# Patient Record
Sex: FEMALE | Race: WHITE | Employment: FULL TIME | URBAN - METROPOLITAN AREA
[De-identification: names, ages, dates, MRNs, and addresses within clinical notes are randomized per-mention and may not be internally consistent; named-entity substitution may affect disease eponyms.]

---

## 2018-05-31 ENCOUNTER — OFFICE VISIT (OUTPATIENT)
Dept: ORTHOPEDIC SURGERY | Age: 63
End: 2018-05-31

## 2018-05-31 VITALS — HEIGHT: 62 IN | BODY MASS INDEX: 35.33 KG/M2 | RESPIRATION RATE: 16 BRPM | WEIGHT: 192 LBS

## 2018-05-31 DIAGNOSIS — Z96.652 S/P TOTAL KNEE ARTHROPLASTY, LEFT: Primary | ICD-10-CM

## 2018-05-31 DIAGNOSIS — G89.29 CHRONIC PAIN OF LEFT KNEE: ICD-10-CM

## 2018-05-31 DIAGNOSIS — R52 PAIN: ICD-10-CM

## 2018-05-31 DIAGNOSIS — M17.11 PRIMARY OSTEOARTHRITIS OF RIGHT KNEE: ICD-10-CM

## 2018-05-31 DIAGNOSIS — M25.562 CHRONIC PAIN OF LEFT KNEE: ICD-10-CM

## 2018-05-31 PROCEDURE — 99244 OFF/OP CNSLTJ NEW/EST MOD 40: CPT | Performed by: ORTHOPAEDIC SURGERY

## 2018-05-31 ASSESSMENT — ENCOUNTER SYMPTOMS
GASTROINTESTINAL NEGATIVE: 1
RESPIRATORY NEGATIVE: 1
BACK PAIN: 1
EYES NEGATIVE: 1

## 2019-10-07 ENCOUNTER — HOSPITAL ENCOUNTER (OUTPATIENT)
Dept: ULTRASOUND IMAGING | Age: 64
Discharge: HOME | End: 2019-10-07
Payer: MEDICARE

## 2019-10-07 DIAGNOSIS — E04.1 THYROID NODULE: ICD-10-CM

## 2019-10-07 PROCEDURE — 76536 US EXAM OF HEAD AND NECK: CPT

## 2020-05-20 ENCOUNTER — HOSPITAL ENCOUNTER (OUTPATIENT)
Age: 65
Discharge: HOME | End: 2020-05-20
Payer: MEDICARE

## 2020-05-20 LAB
ALBUMIN SERPL-MCNC: 4.4 GM/DL (ref 3.4–5)
ALP BLD-CCNC: 95 IU/L (ref 40–128)
ALT SERPL-CCNC: 12 U/L (ref 10–40)
ANION GAP SERPL CALCULATED.3IONS-SCNC: 13 MMOL/L (ref 4–16)
AST SERPL-CCNC: 20 IU/L (ref 15–37)
BILIRUB SERPL-MCNC: 0.4 MG/DL (ref 0–1)
BUN BLDV-MCNC: 16 MG/DL (ref 6–23)
CALCIUM SERPL-MCNC: 9.8 MG/DL (ref 8.3–10.6)
CHLORIDE BLD-SCNC: 101 MMOL/L (ref 99–110)
CHOLESTEROL: 162 MG/DL
CO2: 29 MMOL/L (ref 21–32)
CREAT SERPL-MCNC: 1.1 MG/DL (ref 0.6–1.1)
GFR AFRICAN AMERICAN: 60 ML/MIN/1.73M2
GFR NON-AFRICAN AMERICAN: 50 ML/MIN/1.73M2
GLUCOSE BLD-MCNC: 112 MG/DL (ref 70–99)
HDLC SERPL-MCNC: 51 MG/DL
LDL CHOLESTEROL DIRECT: 73 MG/DL
POTASSIUM SERPL-SCNC: 4.1 MMOL/L (ref 3.5–5.1)
SODIUM BLD-SCNC: 143 MMOL/L (ref 135–145)
TOTAL PROTEIN: 6.7 GM/DL (ref 6.4–8.2)
TRIGL SERPL-MCNC: 277 MG/DL

## 2020-05-20 PROCEDURE — 83721 ASSAY OF BLOOD LIPOPROTEIN: CPT

## 2020-05-20 PROCEDURE — 36415 COLL VENOUS BLD VENIPUNCTURE: CPT

## 2020-05-20 PROCEDURE — 80061 LIPID PANEL: CPT

## 2020-05-20 PROCEDURE — 80053 COMPREHEN METABOLIC PANEL: CPT

## 2020-07-10 ENCOUNTER — HOSPITAL ENCOUNTER (OUTPATIENT)
Dept: LAB | Age: 65
Discharge: HOME | End: 2020-07-10
Payer: MEDICARE

## 2020-07-10 LAB
ANION GAP SERPL CALCULATED.3IONS-SCNC: 12 MMOL/L (ref 4–16)
APTT: 40.7 SECONDS (ref 25.1–37.1)
BASOPHILS ABSOLUTE: 0.1 K/CU MM
BASOPHILS RELATIVE PERCENT: 1.1 % (ref 0–1)
BUN BLDV-MCNC: 20 MG/DL (ref 6–23)
CALCIUM SERPL-MCNC: 10.1 MG/DL (ref 8.3–10.6)
CHLORIDE BLD-SCNC: 101 MMOL/L (ref 99–110)
CO2: 28 MMOL/L (ref 21–32)
CREAT SERPL-MCNC: 1.1 MG/DL (ref 0.6–1.1)
DIFFERENTIAL TYPE: ABNORMAL
EOSINOPHILS ABSOLUTE: 0.1 K/CU MM
EOSINOPHILS RELATIVE PERCENT: 1.7 % (ref 0–3)
GFR AFRICAN AMERICAN: 60 ML/MIN/1.73M2
GFR NON-AFRICAN AMERICAN: 50 ML/MIN/1.73M2
GLUCOSE BLD-MCNC: 99 MG/DL (ref 70–99)
HCT VFR BLD CALC: 42.6 % (ref 37–47)
HEMOGLOBIN: 13.5 GM/DL (ref 12.5–16)
IMMATURE NEUTROPHIL %: 0.5 % (ref 0–0.43)
INR BLD: 1.77 INDEX
LYMPHOCYTES ABSOLUTE: 1.4 K/CU MM
LYMPHOCYTES RELATIVE PERCENT: 21.6 % (ref 24–44)
MCH RBC QN AUTO: 31.2 PG (ref 27–31)
MCHC RBC AUTO-ENTMCNC: 31.7 % (ref 32–36)
MCV RBC AUTO: 98.4 FL (ref 78–100)
MONOCYTES ABSOLUTE: 0.4 K/CU MM
MONOCYTES RELATIVE PERCENT: 5.8 % (ref 0–4)
NUCLEATED RBC %: 0 %
PDW BLD-RTO: 13.5 % (ref 11.7–14.9)
PLATELET # BLD: 185 K/CU MM (ref 140–440)
PMV BLD AUTO: 12.1 FL (ref 7.5–11.1)
POTASSIUM SERPL-SCNC: 4.6 MMOL/L (ref 3.5–5.1)
PROTHROMBIN TIME: 21.6 SECONDS (ref 11.7–14.5)
RBC # BLD: 4.33 M/CU MM (ref 4.2–5.4)
SEGMENTED NEUTROPHILS ABSOLUTE COUNT: 4.4 K/CU MM
SEGMENTED NEUTROPHILS RELATIVE PERCENT: 69.3 % (ref 36–66)
SODIUM BLD-SCNC: 141 MMOL/L (ref 135–145)
TOTAL IMMATURE NEUTOROPHIL: 0.03 K/CU MM
TOTAL NUCLEATED RBC: 0 K/CU MM
WBC # BLD: 6.3 K/CU MM (ref 4–10.5)

## 2020-07-10 PROCEDURE — 85730 THROMBOPLASTIN TIME PARTIAL: CPT

## 2020-07-10 PROCEDURE — 36415 COLL VENOUS BLD VENIPUNCTURE: CPT

## 2020-07-10 PROCEDURE — 85610 PROTHROMBIN TIME: CPT

## 2020-07-10 PROCEDURE — U0002 COVID-19 LAB TEST NON-CDC: HCPCS

## 2020-07-10 PROCEDURE — 80048 BASIC METABOLIC PNL TOTAL CA: CPT

## 2020-07-10 PROCEDURE — 85025 COMPLETE CBC W/AUTO DIFF WBC: CPT

## 2020-07-15 ENCOUNTER — HOSPITAL ENCOUNTER (OUTPATIENT)
Dept: CARDIAC CATH/INVASIVE PROCEDURES | Age: 65
Discharge: HOME | End: 2020-07-15
Attending: INTERNAL MEDICINE | Admitting: INTERNAL MEDICINE
Payer: MEDICARE

## 2020-07-15 VITALS
HEIGHT: 62 IN | TEMPERATURE: 96.4 F | RESPIRATION RATE: 16 BRPM | DIASTOLIC BLOOD PRESSURE: 76 MMHG | HEART RATE: 66 BPM | BODY MASS INDEX: 35.69 KG/M2 | OXYGEN SATURATION: 100 % | SYSTOLIC BLOOD PRESSURE: 125 MMHG

## 2020-07-15 LAB
SARS-COV-2: NOT DETECTED
SOURCE: NORMAL

## 2020-07-15 PROCEDURE — 2709999900 HC NON-CHARGEABLE SUPPLY

## 2020-07-15 PROCEDURE — 2580000003 HC RX 258: Performed by: INTERNAL MEDICINE

## 2020-07-15 PROCEDURE — 6360000002 HC RX W HCPCS

## 2020-07-15 PROCEDURE — 93458 L HRT ARTERY/VENTRICLE ANGIO: CPT

## 2020-07-15 PROCEDURE — 6370000000 HC RX 637 (ALT 250 FOR IP): Performed by: INTERNAL MEDICINE

## 2020-07-15 PROCEDURE — C1769 GUIDE WIRE: HCPCS

## 2020-07-15 PROCEDURE — C1894 INTRO/SHEATH, NON-LASER: HCPCS

## 2020-07-15 PROCEDURE — 6360000004 HC RX CONTRAST MEDICATION

## 2020-07-15 PROCEDURE — C1887 CATHETER, GUIDING: HCPCS

## 2020-07-15 RX ORDER — DIAZEPAM 5 MG/1
5 TABLET ORAL ONCE
Status: COMPLETED | OUTPATIENT
Start: 2020-07-15 | End: 2020-07-15

## 2020-07-15 RX ORDER — PANTOPRAZOLE SODIUM 40 MG/1
40 TABLET, DELAYED RELEASE ORAL DAILY
COMMUNITY

## 2020-07-15 RX ORDER — SODIUM CHLORIDE 0.9 % (FLUSH) 0.9 %
10 SYRINGE (ML) INJECTION PRN
Status: CANCELLED | OUTPATIENT
Start: 2020-07-15

## 2020-07-15 RX ORDER — SODIUM CHLORIDE 9 MG/ML
INJECTION, SOLUTION INTRAVENOUS CONTINUOUS
Status: DISCONTINUED | OUTPATIENT
Start: 2020-07-15 | End: 2020-07-15 | Stop reason: HOSPADM

## 2020-07-15 RX ORDER — RANOLAZINE 500 MG/1
500 TABLET, EXTENDED RELEASE ORAL 2 TIMES DAILY
COMMUNITY
End: 2022-03-03

## 2020-07-15 RX ORDER — ATROPINE SULFATE 0.4 MG/ML
0.5 AMPUL (ML) INJECTION
Status: CANCELLED | OUTPATIENT
Start: 2020-07-15 | End: 2020-07-15

## 2020-07-15 RX ORDER — LORATADINE 10 MG/1
10 TABLET ORAL DAILY
COMMUNITY
End: 2022-03-03

## 2020-07-15 RX ORDER — MEMANTINE HYDROCHLORIDE 10 MG/1
10 TABLET ORAL 2 TIMES DAILY
COMMUNITY
End: 2022-03-03 | Stop reason: SDUPTHER

## 2020-07-15 RX ORDER — ATORVASTATIN CALCIUM 40 MG/1
40 TABLET, FILM COATED ORAL NIGHTLY
COMMUNITY

## 2020-07-15 RX ORDER — DIAZEPAM 5 MG/1
5 TABLET ORAL EVERY 6 HOURS PRN
Status: DISCONTINUED | OUTPATIENT
Start: 2020-07-15 | End: 2020-07-15 | Stop reason: HOSPADM

## 2020-07-15 RX ORDER — ACETAMINOPHEN 325 MG/1
650 TABLET ORAL EVERY 4 HOURS PRN
Status: CANCELLED | OUTPATIENT
Start: 2020-07-15

## 2020-07-15 RX ORDER — SODIUM CHLORIDE 0.9 % (FLUSH) 0.9 %
10 SYRINGE (ML) INJECTION EVERY 12 HOURS SCHEDULED
Status: CANCELLED | OUTPATIENT
Start: 2020-07-15

## 2020-07-15 RX ORDER — SODIUM CHLORIDE 9 MG/ML
INJECTION, SOLUTION INTRAVENOUS CONTINUOUS
Status: CANCELLED | OUTPATIENT
Start: 2020-07-15

## 2020-07-15 RX ORDER — LISINOPRIL AND HYDROCHLOROTHIAZIDE 25; 20 MG/1; MG/1
1 TABLET ORAL DAILY
COMMUNITY
End: 2022-03-03

## 2020-07-15 RX ORDER — DIPHENHYDRAMINE HCL 25 MG
25 TABLET ORAL ONCE
Status: COMPLETED | OUTPATIENT
Start: 2020-07-15 | End: 2020-07-15

## 2020-07-15 RX ORDER — MORPHINE SULFATE 2 MG/ML
1 INJECTION, SOLUTION INTRAMUSCULAR; INTRAVENOUS
Status: CANCELLED | OUTPATIENT
Start: 2020-07-15 | End: 2020-07-15

## 2020-07-15 RX ADMIN — DIPHENHYDRAMINE HYDROCHLORIDE 25 MG: 25 TABLET ORAL at 08:12

## 2020-07-15 RX ADMIN — DIAZEPAM 5 MG: 5 TABLET ORAL at 08:12

## 2020-07-15 RX ADMIN — SODIUM CHLORIDE: 9 INJECTION, SOLUTION INTRAVENOUS at 08:12

## 2020-07-15 ASSESSMENT — PAIN DESCRIPTION - PAIN TYPE: TYPE: CHRONIC PAIN

## 2020-07-15 ASSESSMENT — PAIN SCALES - GENERAL: PAINLEVEL_OUTOF10: 8

## 2020-07-15 ASSESSMENT — PAIN DESCRIPTION - LOCATION: LOCATION: GENERALIZED

## 2020-07-15 NOTE — OP NOTE
Operative Note      Patient: Marli De La Cruz  YOB: 1955  MRN: [id number]    Date of Procedure: 7/15/20    Pre-Op Diagnosis: chest pain     Post-Op Diagnosis: Same    Estimated Blood Loss (mL): Minimal    Complications: None    Electronically signed by Juventino Tanner MD on 7/15/2020 at 9:30 AM     DICTATED --[id number]  LEFT MAIN PATENT  LAD/LCX AND RCA MILD DX  LVEDP 15-20  [address] [facility]

## 2020-07-15 NOTE — PROCEDURES
[address] [place] [address], [address] [place] [address]                            CARDIAC CATHETERIZATION    PATIENT NAME: Dina Oliver                 :        1955  MED REC NO:   [id number]                          ROOM:  ACCOUNT NO:   [de-identified]                           ADMIT DATE: 07/15/2020  PROVIDER:     Ermelinda Woods MD    DATE OF PROCEDURE:  07/15/2020    INDICATION:  Chest pain and shortness of breath. DESCRIPTION OF PROCEDURE:  This is a 78-year-old female patient, brought  to cath lab today. Informed consent was obtained from the patient. The  patient was prepped and draped in a sterile fashion. The patient was  injected with 5 mL of 2% lidocaine in the right radial region. Using a  radial needle, right radial artery was canalized and a 5/6-Kiswahili sheath  was placed in the right radial artery. The entire procedure was done  using guidewire. The sheath was flushed in between the procedures. Using a JL3.5 catheter, left coronary angiogram was performed. Left  coronary angiogram revealed the left main is patent. It bifurcates in  the LAD and circumflex artery. Circ is a medium-sized vessel, gives off the OM-1 and OM-2 branch. There is mild disease noted. LAD is a medium-sized vessel, reaches and wraps the apex, and gives off  a medium-sized diagonal branch. LAD has mild disease noted. Using an AR MOD catheter, right coronary angiogram performed. Right  coronary angiogram revealed the right coronary is a large-sized vessel,  dominant vessel. Right coronary artery has mild disease noted. Using a pigtail catheter, EDP was measured. EDP was slightly elevated,  between 15 to 20 mmHg present. The patient has a back stimulator present. IMPRESSION:  1. Left main is patent. 2.  LAD, circ, and ramus all have mild disease noted. 3.  EDP is around 15 to 20 mmHg present. Continue medical treatment.   The patient tolerated the procedure well. No complications noted.       BLOOD LOSS 10CC  Estephanie Sebastian MD    D: 07/15/2020 9:33:04       T: 07/15/2020 10:17:28     [email or web address]  Job#: [id number]     Doc#: [id number]    CC:

## 2020-07-15 NOTE — PROGRESS NOTES
Received from cath lab. Monitor and alarms on. Call Light in reach. Procedure site assessment completed per ZENON Haque RN and J Peabody RN      No hematoma or bleeding noted.

## 2020-07-15 NOTE — PROGRESS NOTES
Discharge instructions reviewed with patient. Voices understanding. Ambulated without difficulty. Tegaderm to Rt radial area. Armboard remains in place.

## 2020-07-15 NOTE — H&P
[address] [place] [address], [address] [place] [address]                              HISTORY AND PHYSICAL    PATIENT NAME: Va Pritchard                 :        1955  MED REC NO:   [id number]                          ROOM:  ACCOUNT NO:   [de-identified]                           ADMIT DATE: 07/15/2020  PROVIDER:     Nabila Mehta MD    REASON FOR ADMISSION:  Abnormal stress test and chest pain. HISTORY OF PRESENT ILLNESS:  This is a 57-year-old female patient, a  patient of Dr. Stephania Felix. She has been having chest pain and shortness  of breath present. The patient has a history of diabetes and  hypertension present. She has a history of stroke present. The patient continues to have symptoms present; therefore, the plan is  for heart catheterization. PAST MEDICAL HISTORY:  Coronary artery disease, hypertension,  hyperlipidemia, diabetes present, history of stroke, anxiety,  depression, fibromyalgia, GERD present, and hypothyroidism present. PAST SURGICAL HISTORY:  Appendectomy, gallbladder surgery, hysterectomy,  total knee replacement. SOCIAL HISTORY:  She does not smoke, does not drink. ALLERGIES:  NKDA. MEDICATIONS:  She is on aspirin, atorvastatin, Cartia, Coumadin,  lisinopril, hydrochlorothiazide, and Ranexa. PHYSICAL EXAMINATION:  GENERAL:  The patient is awake and alert, answering questions, not in  acute distress. VITAL SIGNS:  Temperature is afebrile, pulse is 66, blood pressure  150/73. HEENT:  Head is normocephalic and atraumatic. Pupils are equal and  reactive. CHEST:  Equal expansion. LUNGS:  Clear to auscultation. No wheezing or rhonchi. HEART:  Regular rhythm. ABDOMEN:  Soft and nontender. Bowel sounds are present. No  hepatosplenomegaly or guarding appreciated. EXTREMITIES:  No cyanosis or clubbing noted. NEUROLOGIC:  Cranial nerves II through XII are grossly intact.     DIAGNOSTIC STUDIES:  The patient had an echo done back in 05/2020. LV  function was preserved, mild aortic stenosis was noted. Stress test in  2019 with negative for ischemia. IMPRESSION AND PLAN:  1. This is a 49-year-old female patient with history of hypertension,  hyperlipidemia, and diabetes present, ongoing symptoms of chest pain and  shortness of breath present with minimal exertion. She had a stroke and  she is on Coumadin for that. The plan is for heart catheterization  today. We will make further recommendations. 2.  History of stroke present. She is on anticoagulation for that. I  would recommend may be possibly doing a loop recorder on her to rule out  paroxysmal atrial fibrillation. Further recommendations will be based  on hospital course.         Irlanda Dickens MD    D: 07/15/2020 8:01:36       T: 07/15/2020 9:52:01     [email or web address]  Job#: [id number]     Doc#: [id number]    CC:

## 2022-03-02 NOTE — PROGRESS NOTES
3/2/22    Fox Brooksdaisy  1955    Chief Complaint   Patient presents with    Follow-up     Multiple CVA's last one 2018. Used to follow with Dr. Delilah Manning        History of Present Illness  Jacqueline Kim is a 74 y.o. female presenting today for follow-up of:  MCI and history of CVA. She remains on memantine 10 mg twice daily and donepezil 10 mg daily. As of last visit 8/20/2021 she was preparing for surgery in September for discectomy and fusion. She had surgery and is feeling much better. She has gone back to work and is taking online [employer] courses for accounting. She is doing very well and having no difficulty with her memory or ability to follow tasks, keep up with assignments or retain information. She is in an accelerated program.     She remains on aspirin, Coumadin, statin for secondary stroke prevention. She does have history of JANENE and uses Pap device for management. She tells me today that she has been experiencing excessive throat clearing. She has been doing this ever since her last stroke. She also reports occasionally difficulty swallowing food and fluids and will sometimes cough and choke. She knows for sure her after her stroke in 1982 she had dysphagia and participated in speech/swallow therapy however she does not remember if she had any difficulty swallowing after her most recent stroke. She does report having allergies for which she is on allergy medication however she tells me her sisters have told her she was not clearing her throat like this prior to this past stroke.     Current Outpatient Medications   Medication Sig Dispense Refill    atorvastatin (LIPITOR) 40 MG tablet Take 40 mg by mouth nightly      memantine (NAMENDA) 10 MG tablet Take 10 mg by mouth 2 times daily      lisinopril-hydroCHLOROthiazide (PRINZIDE;ZESTORETIC) 20-25 MG per tablet Take 1 tablet by mouth daily      ranolazine (RANEXA) 500 MG extended release tablet Take 500 mg by mouth 2 times daily Takes 2 tabs twice daily      pantoprazole (PROTONIX) 40 MG tablet Take 40 mg by mouth daily      loratadine (CLARITIN) 10 MG tablet Take 10 mg by mouth daily      traMADol (ULTRAM) 50 MG tablet Take 1 tablet by mouth 2 times daily as needed for Pain 60 tablet 0    aspirin 81 MG chewable tablet Take 1 tablet by mouth daily 30 tablet 3    warfarin (COUMADIN) 5 MG tablet Take 1 tablet by mouth daily 30 tablet 3    gabapentin (NEURONTIN) 300 MG capsule Take 2 capsules by mouth 2 times daily (Patient taking differently: Take 600 mg by mouth nightly. ) 90 capsule 3    simvastatin (ZOCOR) 20 MG tablet Take 1 tablet by mouth nightly 30 tablet 3    docusate sodium (COLACE, DULCOLAX) 100 MG CAPS Take 100 mg by mouth 2 times daily as needed for Constipation      ondansetron (ZOFRAN) 4 MG tablet Take 1 tablet by mouth every 8 hours as needed for Nausea or Vomiting      Ascorbic Acid (VITAMIN C) 250 MG tablet Take 1,000 mg by mouth daily       diltiazem (DILACOR XR) 180 MG XR capsule Take 180 mg by mouth daily.  olmesartan-hydrochlorothiazide (BENICAR HCT) 40-25 MG per tablet Take 1 tablet by mouth daily.  Calcium Carbonate-Vitamin D (CALCIUM + D) 600-200 MG-UNIT TABS Take 1 tablet by mouth.  Cholecalciferol (VITAMIN D3) 50 MCG (2000 UT) TABS Take 1 capsule by mouth 50 mcg daily      vitamin B-12 (CYANOCOBALAMIN) 1000 MCG tablet Take 1,000 mcg by mouth daily.  therapeutic multivitamin-minerals (THERAGRAN-M) tablet Take 1 tablet by mouth daily Centrum silver multivitamin      Acetaminophen 650 MG TABS Take 1 tablet by mouth daily Arthritis tylenol 2 tabs in am       No current facility-administered medications for this visit.        Physical Exam:  Also present during visit:     Mental Status   Orientation: oriented to person, oriented to place, oriented to problem and oriented to time    Mood/affectappropriate mood and appropriate affect   Memory/Other: recent memory intact, remote memory intact, fund of knowledge intact, attention span normal and concentration normal   8/2/2021: MMSE 29/30 2/3 word recall, everything else correct. 5/3/2021 2/3 word recall)  Language  Language: (normal) language, no dysarthria, (normal) articulation and no dysphasia/aphasia  Cranial Nerves   Eyes: pupils normal size and reactive to light and visual fields appear full   CN III, IV, VI : extraocular muscle strength normal, normal pursuit, no nystagmus and no ptosis   Facial Motor: normal facial motor   CN XII: tongue protrudes midline  Motor/Coordination Exam   Power: motor strength appears intact throughout, no arm drift and normal tone,    Coordination: normal finger-to-nose, forearm rotation intact and rapid alternating movement normal Left upper and left lower extremity mild muscle weakness  Sensory Exam: normal light touch, normal vibration, normal position, no neglect, decreased pinprick LLE __, decreased pinprick LUE __, decreased temperature LUE      Gait and Stance   Gait/Posture: station normal, ambulates independently, abnormal Romberg's test moderate, unsteady casual gait mild (Mild limp of the left leg)           BP (!) 150/90 (Site: Left Upper Arm, Position: Sitting, Cuff Size: Medium Adult)   Pulse 63   Ht 5' 2\" (1.575 m)   Wt 190 lb (86.2 kg)   SpO2 98%   BMI 34.75 kg/m²     Assessment and Plan     Diagnosis Orders   1. Dysphagia as late effect of cerebrovascular accident (CVA)  SLP swallowing-dysphagia (IP)   2. History of CVA (cerebrovascular accident)     3. Fibromyalgia     4. Sleep apnea, unspecified type       I will order speech/swallow therapy to determine if Muna Herndon is experiencing some dysphagia after her past stroke. She will remain on aspirin, Coumadin, statin for secondary stroke prevention. In regard to memory, she is doing outstanding. I am very proud of her for going back to school and taking accelerated accounting classes. She is doing well at work.   She has no concerns with her memory. She will remain on memantine and Aricept. She will continue wearing her Pap device routinely for management of her JANENE. I will plan on following up with Willie Jenkins again in 3 months, sooner if the need arises. Patient understands the importance of recognizing strokelike symptoms such as acute mental status change, slurred speech, facial droop, one-sided weakness differing from baseline. Patient knows to call 911 immediately in the event that they experience any of these symptoms. Medications prescribed for the patient were discussed in detail. This included a discussion of the potential risks versus potential benefits of the medications. The patient was given time to ask questions and these were answered to the best of my ability. The patient appeared to understand the information provided. We discussed nonpharmacologic interventions including staying active cognitively, socially, and physically to help slow down the progression of memory loss. Return in about 3 months (around 6/3/2022).     JAIME Alves NP

## 2022-03-03 ENCOUNTER — OFFICE VISIT (OUTPATIENT)
Dept: NEUROLOGY | Age: 67
End: 2022-03-03
Payer: MEDICARE

## 2022-03-03 VITALS
WEIGHT: 190 LBS | HEART RATE: 63 BPM | BODY MASS INDEX: 34.96 KG/M2 | SYSTOLIC BLOOD PRESSURE: 150 MMHG | DIASTOLIC BLOOD PRESSURE: 90 MMHG | HEIGHT: 62 IN | OXYGEN SATURATION: 98 %

## 2022-03-03 DIAGNOSIS — M79.7 FIBROMYALGIA: ICD-10-CM

## 2022-03-03 DIAGNOSIS — G47.30 SLEEP APNEA, UNSPECIFIED TYPE: ICD-10-CM

## 2022-03-03 DIAGNOSIS — I69.391 DYSPHAGIA AS LATE EFFECT OF CEREBROVASCULAR ACCIDENT (CVA): Primary | ICD-10-CM

## 2022-03-03 DIAGNOSIS — Z86.73 HISTORY OF CVA (CEREBROVASCULAR ACCIDENT): ICD-10-CM

## 2022-03-03 PROCEDURE — 99214 OFFICE O/P EST MOD 30 MIN: CPT | Performed by: NURSE PRACTITIONER

## 2022-03-03 RX ORDER — DONEPEZIL HYDROCHLORIDE 10 MG/1
10 TABLET, FILM COATED ORAL NIGHTLY
COMMUNITY
End: 2022-03-03 | Stop reason: SDUPTHER

## 2022-03-03 RX ORDER — MEMANTINE HYDROCHLORIDE 10 MG/1
10 TABLET ORAL 2 TIMES DAILY
Qty: 180 TABLET | Refills: 3 | Status: SHIPPED | OUTPATIENT
Start: 2022-03-03

## 2022-03-03 RX ORDER — LOSARTAN POTASSIUM 25 MG/1
25 TABLET ORAL DAILY
COMMUNITY

## 2022-03-03 RX ORDER — DONEPEZIL HYDROCHLORIDE 10 MG/1
10 TABLET, FILM COATED ORAL NIGHTLY
Qty: 90 TABLET | Refills: 3 | Status: SHIPPED | OUTPATIENT
Start: 2022-03-03

## 2022-03-03 RX ORDER — FEXOFENADINE HCL 180 MG/1
180 TABLET ORAL DAILY
COMMUNITY

## 2022-03-25 ENCOUNTER — HOSPITAL ENCOUNTER (OUTPATIENT)
Dept: SPEECH THERAPY | Age: 67
Setting detail: THERAPIES SERIES
Discharge: HOME | End: 2022-03-25
Payer: MEDICARE

## 2022-03-25 PROCEDURE — 92610 EVALUATE SWALLOWING FUNCTION: CPT

## 2022-03-25 NOTE — PROGRESS NOTES
Speech Language Pathology  Facility/Department: [address] [place] [address] THERAPY   CLINICAL BEDSIDE SWALLOW EVALUATION    NAME: Marisol Call  : 1955  MRN: [id number]    ADMISSION DATE: 3/25/2022     IMPRESSIONS: Marisol Call was seen for an outpatient clinical swallow evaluation. Pt reports that she has seen neurology, GI, and ENT for complaint of throat clearing and occasional \"choking\" sensation with foods and liquids, ultimately leading to referral for dysphagia assessment. Symptoms were noted to begin following initial CVA in the . She reports that throat clearing happens intermittently, not necessarily with eating or drinking. Per pt, previous GI and ENT assessments have been unremarkable. Pt also reports occasional globus sensation when eating or drinking. She states that she was prescribed medication for reflux but was unable to take it d/t interaction with other medications. She denies any recent history of PNA. PMHx significant for GERD, hypertension, fibromyalgia, chronic bronchitis, CVA and TIA; most recent CVA in 2017. No prior SLP notes found in chart. Pt seen for evaluation seated upright in chair, alert, pleasant, cooperative. Oral mechanism examination WFL without asymmetry. Pt presented with PO trials of thin liquids via cup/straw, puree, and regular solids. Oral stage WFL with intact labial seal, mastication/bolus formation, AP transit, and oral clearance. Pharyngeal stage WFL with adequate swallow initiation/laryngeal elevation. Coughing noted x2 during assessment but did not appear related to PO trials. No overt s/s aspiration identified. Education and handouts were provided re: reflux precautions, habitual throat clearing and vocal hygiene. Behavioral strategies and dietary modifications were reviewed. Pt indicated understanding. Recommend continued regular diet/thin liquids with general reflux precautions.  Recommend vocal hygiene including swallow in place of throat clearing and improving hydration with increased water intake. No further dysphagia tx indicated at this time. Results/recommendations were discussed with pt, who verbalized understanding/agreement. Past Medical History:  has a past medical history of Diabetes mellitus ([address] Fibromyalgia, Heart murmur, Hypertension, Migraines, Sleep apnea, Thyroid disease, and Unspecified cerebral artery occlusion with cerebral infarction. Past Surgical History:  has a past surgical history that includes back surgery (rods and fusions in the back); Cholecystectomy, laparoscopic (05/22/14); Appendectomy; Colonoscopy; Hysterectomy; Tonsillectomy; joint replacement; and Spinal Cord Stimulator Surgery. Recent Chest Xray/CT of Chest: see chart  Treatment Dx (ICD 10 code): dysphagia R13.10        Date of Eval: 3/25/2022  Evaluating Therapist: ROSA Rios    Current Diet level:  Current Diet : Regular  Current Liquid Diet : Thin    Primary Complaint:   Patient Complaint: throat clearing        Pain:   0    Reason for Referral  Torrey Baca was referred for a bedside swallow evaluation to assess the efficiency of her swallow function, identify signs and symptoms of aspiration, and make recommendations regarding safe dietary consistencies, effective compensatory strategies, and safe eating environment. Impression  Dysphagia Diagnosis  Dysphagia Diagnosis: Swallow function appears grossly intact  Dysphagia Outcome Severity Scale: Level 6: Within functional limits/Modified independence    Treatment Plan  Requires SLP Intervention: No     Duration/Frequency of Treatment: n/a  D/C Recommendations: Home independently      Recommended Diet and Intervention  Solid: Diet Solids Recommendation: Regular  Liquid: Liquid Consistency Recommendation: Thin  Medication:Recommended Form of Meds: PO       Compensatory Swallowing Strategies Attempted  Compensatory Swallowing Strategies:  Alternate solids and liquids;Eat/Feed slowly;Upright as possible for all oral intake;Small bites/sips      Treatment/Goals  Short-term Goals  Timeframe for Short-term Goals: n/a  Long-term Goals  Timeframe for Long-term Goals: n/a    General  Chart Reviewed: Yes  Behavior/Cognition  Behavior/Cognition: Alert; Cooperative;Pleasant mood  Respiratory Status: Room air  O2 Device: None (Room air)  Communication Observation: Functional  Follows Directions: Simple  Dentition: Adequate  Patient Positioning: Upright in chair  Baseline Vocal Quality: Normal  Consistencies Administered: Reg solid; Dysphagia Pureed (Dysphagia I); Thin - cup; Thin - straw         Vision/Hearing  Vision  Vision: Within Functional Limits  Hearing  Hearing: Within functional limits    Oral Motor Deficits  Oral/Motor  Oral Motor:  Within functional limits    Oral Phase Dysfunction  Oral Phase  Oral Phase: WFL     Indicators of Pharyngeal Phase Dysfunction   Pharyngeal Phase  Pharyngeal Phase: WFL    Prognosis  Prognosis  Barriers/Prognosis Comment: n/a  Individuals consulted  Consulted and agree with results and recommendations: Patient    Education  Patient Education: results, recommendations  Patient Education Response: Verbalizes understanding       Therapy Time  SLP Individual Minutes  Time In: 1500  Time Out: 7101  Minutes: 703 [facility]-SLP  3/25/2022 3:49 PM

## 2023-04-17 ENCOUNTER — HOSPITAL ENCOUNTER (OUTPATIENT)
Dept: PULMONOLOGY | Age: 68
Discharge: HOME | End: 2023-04-17
Payer: MEDICARE

## 2023-04-17 DIAGNOSIS — R06.02 SOB (SHORTNESS OF BREATH): ICD-10-CM

## 2023-04-17 LAB
DLCO %PRED: 90 %
DLCO PRED: NORMAL
DLCO/VA %PRED: NORMAL
DLCO/VA PRED: NORMAL
DLCO/VA: NORMAL
DLCO: NORMAL
EXPIRATORY TIME-POST: NORMAL
EXPIRATORY TIME: NORMAL
FEF 25-75% %CHNG: NORMAL
FEF 25-75% %PRED-POST: NORMAL
FEF 25-75% %PRED-PRE: NORMAL
FEF 25-75% PRED: NORMAL
FEF 25-75%-POST: NORMAL
FEF 25-75%-PRE: NORMAL
FEV1 %PRED-POST: 93 %
FEV1 %PRED-PRE: 78 %
FEV1 PRED: NORMAL
FEV1-POST: NORMAL
FEV1-PRE: NORMAL
FEV1/FVC %PRED-POST: 111 %
FEV1/FVC %PRED-PRE: 106 %
FEV1/FVC PRED: NORMAL
FEV1/FVC-POST: NORMAL
FEV1/FVC-PRE: NORMAL
FVC %PRED-POST: 83 L
FVC %PRED-PRE: 73 %
FVC PRED: NORMAL
FVC-POST: NORMAL
FVC-PRE: NORMAL
GAW %PRED: NORMAL
GAW PRED: NORMAL
GAW: NORMAL
IC %PRED: NORMAL
IC PRED: NORMAL
IC: NORMAL
MEP: NORMAL
MIP: NORMAL
MVV %PRED-PRE: NORMAL
MVV PRED: NORMAL
MVV-PRE: NORMAL
PEF %PRED-POST: NORMAL
PEF %PRED-PRE: NORMAL
PEF PRED: NORMAL
PEF%CHNG: NORMAL
PEF-POST: NORMAL
PEF-PRE: NORMAL
RAW %PRED: NORMAL
RAW PRED: NORMAL
RAW: NORMAL
RV %PRED: NORMAL
RV PRED: NORMAL
RV: NORMAL
SVC %PRED: NORMAL
SVC PRED: NORMAL
SVC: NORMAL
TLC %PRED: 87 %
TLC PRED: NORMAL
TLC: NORMAL
VA %PRED: NORMAL
VA PRED: NORMAL
VA: NORMAL
VTG %PRED: NORMAL
VTG PRED: NORMAL
VTG: NORMAL

## 2023-04-17 PROCEDURE — 94060 EVALUATION OF WHEEZING: CPT

## 2023-04-17 PROCEDURE — 94726 PLETHYSMOGRAPHY LUNG VOLUMES: CPT

## 2023-04-17 PROCEDURE — 94729 DIFFUSING CAPACITY: CPT

## 2023-04-17 ASSESSMENT — PULMONARY FUNCTION TESTS
FEV1/FVC_PERCENT_PREDICTED_POST: 111
FEV1_PERCENT_PREDICTED_PRE: 78
FVC_PERCENT_PREDICTED_PRE: 73
FEV1/FVC_PERCENT_PREDICTED_PRE: 106
FVC_PERCENT_PREDICTED_POST: 83
FEV1_PERCENT_PREDICTED_POST: 93

## 2023-07-24 RX ORDER — ALBUTEROL SULFATE 90 UG/1
2 AEROSOL, METERED RESPIRATORY (INHALATION) EVERY 6 HOURS PRN
COMMUNITY

## 2023-07-24 RX ORDER — FLUTICASONE FUROATE AND VILANTEROL 100; 25 UG/1; UG/1
1 POWDER RESPIRATORY (INHALATION) DAILY
COMMUNITY

## 2023-07-25 ENCOUNTER — ANESTHESIA EVENT (OUTPATIENT)
Dept: ENDOSCOPY | Age: 68
End: 2023-07-25
Payer: MEDICARE

## 2023-07-25 NOTE — ANESTHESIA PRE PROCEDURE
Department of Anesthesiology  Preprocedure Note       Name:  Dov Dupont   Age:  77 y.o.  :  1955                                          MRN:  [id number]         Date:  2023      Surgeon: Lincoln Mcardle):  Jenna Arce MD    Procedure: Procedure(s):  COLONOSCOPY DIAGNOSTIC    Medications prior to admission:   Prior to Admission medications    Medication Sig Start Date End Date Taking? Authorizing Provider   albuterol sulfate HFA (PROVENTIL;VENTOLIN;PROAIR) 108 (90 Base) MCG/ACT inhaler Inhale 2 puffs into the lungs every 6 hours as needed for Wheezing   Yes Historical Provider, MD   fluticasone furoate-vilanterol (BREO ELLIPTA) 100-25 MCG/ACT inhaler Inhale 1 puff into the lungs daily   Yes Historical Provider, MD   apixaban (ELIQUIS) 5 MG TABS tablet Take by mouth 2 times daily   Yes Historical Provider, MD   losartan (COZAAR) 25 MG tablet Take 25 mg by mouth daily    Historical Provider, MD   fexofenadine (ALLEGRA ALLERGY) 180 MG tablet Take 180 mg by mouth daily    Historical Provider, MD   Acetaminophen (TYLENOL ARTHRITIS PAIN PO) Take by mouth    Historical Provider, MD   donepezil (ARICEPT) 10 MG tablet Take 1 tablet by mouth nightly 3/3/22   JAIME Toribio - NP   memantine (NAMENDA) 10 MG tablet Take 1 tablet by mouth 2 times daily 3/3/22   JAIME Toribio - NP   atorvastatin (LIPITOR) 40 MG tablet Take 40 mg by mouth nightly    Historical Provider, MD   pantoprazole (PROTONIX) 40 MG tablet Take 40 mg by mouth daily    Historical Provider, MD   aspirin 81 MG chewable tablet Take 1 tablet by mouth daily 10/3/16   [facility], APRN - CNP   warfarin (COUMADIN) 5 MG tablet Take 1 tablet by mouth daily 10/3/16   [facility], APRN - CNP   gabapentin (NEURONTIN) 300 MG capsule Take 2 capsules by mouth 2 times daily  Patient taking differently: Take 600 mg by mouth 2 times daily as needed.   10/3/16   JAIME Jaramillo - CNP   Ascorbic Acid (VITAMIN C) 250 MG tablet Take 1,000 mg by mouth

## 2023-07-26 NOTE — PROGRESS NOTES
Spoke with patient and she will arrive at 0800 at [facility] on 7/27/2023 for her procedure at 0930. IV order is in epic.

## 2023-07-27 ENCOUNTER — HOSPITAL ENCOUNTER (OUTPATIENT)
Age: 68
Setting detail: OUTPATIENT SURGERY
Discharge: HOME | End: 2023-07-27
Attending: INTERNAL MEDICINE | Admitting: INTERNAL MEDICINE
Payer: MEDICARE

## 2023-07-27 ENCOUNTER — ANESTHESIA (OUTPATIENT)
Dept: ENDOSCOPY | Age: 68
End: 2023-07-27
Payer: MEDICARE

## 2023-07-27 VITALS
OXYGEN SATURATION: 99 % | RESPIRATION RATE: 16 BRPM | SYSTOLIC BLOOD PRESSURE: 164 MMHG | DIASTOLIC BLOOD PRESSURE: 58 MMHG | BODY MASS INDEX: 40.48 KG/M2 | WEIGHT: 220 LBS | HEART RATE: 88 BPM | HEIGHT: 62 IN | TEMPERATURE: 98.1 F

## 2023-07-27 LAB — GLUCOSE BLD-MCNC: 122 MG/DL (ref 70–99)

## 2023-07-27 PROCEDURE — 3609027000 HC COLONOSCOPY: Performed by: INTERNAL MEDICINE

## 2023-07-27 PROCEDURE — 6360000002 HC RX W HCPCS: Performed by: NURSE ANESTHETIST, CERTIFIED REGISTERED

## 2023-07-27 PROCEDURE — 2500000003 HC RX 250 WO HCPCS: Performed by: NURSE ANESTHETIST, CERTIFIED REGISTERED

## 2023-07-27 PROCEDURE — 2580000003 HC RX 258: Performed by: ANESTHESIOLOGY

## 2023-07-27 PROCEDURE — 7100000010 HC PHASE II RECOVERY - FIRST 15 MIN: Performed by: INTERNAL MEDICINE

## 2023-07-27 PROCEDURE — 2709999900 HC NON-CHARGEABLE SUPPLY: Performed by: INTERNAL MEDICINE

## 2023-07-27 PROCEDURE — 3700000000 HC ANESTHESIA ATTENDED CARE: Performed by: INTERNAL MEDICINE

## 2023-07-27 PROCEDURE — 7100000011 HC PHASE II RECOVERY - ADDTL 15 MIN: Performed by: INTERNAL MEDICINE

## 2023-07-27 PROCEDURE — 82962 GLUCOSE BLOOD TEST: CPT

## 2023-07-27 PROCEDURE — 3700000001 HC ADD 15 MINUTES (ANESTHESIA): Performed by: INTERNAL MEDICINE

## 2023-07-27 RX ORDER — HYDRALAZINE HYDROCHLORIDE 20 MG/ML
INJECTION INTRAMUSCULAR; INTRAVENOUS PRN
Status: DISCONTINUED | OUTPATIENT
Start: 2023-07-27 | End: 2023-07-27 | Stop reason: SDUPTHER

## 2023-07-27 RX ORDER — SODIUM CHLORIDE, SODIUM LACTATE, POTASSIUM CHLORIDE, CALCIUM CHLORIDE 600; 310; 30; 20 MG/100ML; MG/100ML; MG/100ML; MG/100ML
INJECTION, SOLUTION INTRAVENOUS CONTINUOUS
Status: DISCONTINUED | OUTPATIENT
Start: 2023-07-27 | End: 2023-07-27 | Stop reason: HOSPADM

## 2023-07-27 RX ORDER — PROPOFOL 10 MG/ML
INJECTION, EMULSION INTRAVENOUS PRN
Status: DISCONTINUED | OUTPATIENT
Start: 2023-07-27 | End: 2023-07-27 | Stop reason: SDUPTHER

## 2023-07-27 RX ORDER — LIDOCAINE HYDROCHLORIDE 20 MG/ML
INJECTION, SOLUTION EPIDURAL; INFILTRATION; INTRACAUDAL; PERINEURAL PRN
Status: DISCONTINUED | OUTPATIENT
Start: 2023-07-27 | End: 2023-07-27 | Stop reason: SDUPTHER

## 2023-07-27 RX ADMIN — PROPOFOL 440 MG: 10 INJECTION, EMULSION INTRAVENOUS at 10:32

## 2023-07-27 RX ADMIN — SODIUM CHLORIDE, POTASSIUM CHLORIDE, SODIUM LACTATE AND CALCIUM CHLORIDE: 600; 310; 30; 20 INJECTION, SOLUTION INTRAVENOUS at 08:42

## 2023-07-27 RX ADMIN — LIDOCAINE HYDROCHLORIDE 100 MG: 20 INJECTION, SOLUTION EPIDURAL; INFILTRATION; INTRACAUDAL; PERINEURAL at 10:12

## 2023-07-27 RX ADMIN — HYDRALAZINE HYDROCHLORIDE 5 MG: 20 INJECTION, SOLUTION INTRAMUSCULAR; INTRAVENOUS at 10:07

## 2023-07-27 ASSESSMENT — PAIN SCALES - GENERAL
PAINLEVEL_OUTOF10: 7
PAINLEVEL_OUTOF10: 7

## 2023-07-27 ASSESSMENT — PAIN - FUNCTIONAL ASSESSMENT
PAIN_FUNCTIONAL_ASSESSMENT: ACTIVITIES ARE NOT PREVENTED
PAIN_FUNCTIONAL_ASSESSMENT: 0-10
PAIN_FUNCTIONAL_ASSESSMENT: ACTIVITIES ARE NOT PREVENTED

## 2023-07-27 ASSESSMENT — PAIN DESCRIPTION - ORIENTATION
ORIENTATION: LOWER
ORIENTATION: LOWER

## 2023-07-27 ASSESSMENT — PAIN DESCRIPTION - LOCATION
LOCATION: BACK
LOCATION: BACK

## 2023-07-27 ASSESSMENT — PAIN DESCRIPTION - PAIN TYPE
TYPE: CHRONIC PAIN
TYPE: CHRONIC PAIN

## 2023-07-27 ASSESSMENT — PAIN DESCRIPTION - DESCRIPTORS
DESCRIPTORS: ACHING
DESCRIPTORS: ACHING

## 2023-07-27 ASSESSMENT — PAIN DESCRIPTION - FREQUENCY: FREQUENCY: CONTINUOUS

## 2023-07-27 NOTE — ANESTHESIA POSTPROCEDURE EVALUATION
Department of Anesthesiology  Postprocedure Note    Patient: Garth Knutson  MRN: [id number]  YOB: 1955  Date of evaluation: 7/27/2023      Procedure Summary     Date: 07/27/23 Room / Location: [address] [place] [address]    Anesthesia Start: 1002 Anesthesia Stop: 1034    Procedure: COLONOSCOPY DIAGNOSTIC Diagnosis:       Change in bowel habits      RUQ pain      Nausea and vomiting, unspecified vomiting type      History of CVA (cerebrovascular accident) without residual deficits      (Change in bowel habits [R19.4])      (RUQ pain [R10.11])      (Nausea and vomiting, unspecified vomiting type [R11.2])      (History of CVA (cerebrovascular accident) without residual deficits [Z86.73])    Surgeons: Shi Watson MD Responsible Provider: JAIME Esposito CRNA    Anesthesia Type: MAC ASA Status: 3          Anesthesia Type: No value filed.     Kiki Phase I: Kiki Score: 10    Kiki Phase II:        Anesthesia Post Evaluation    Patient location during evaluation: bedside  Patient participation: complete - patient participated  Level of consciousness: awake and alert  Pain score: 0  Airway patency: patent  Nausea & Vomiting: no nausea and no vomiting  Complications: no  Cardiovascular status: blood pressure returned to baseline  Respiratory status: acceptable  Hydration status: euvolemic

## 2023-07-27 NOTE — DISCHARGE INSTRUCTIONS
COLONOSCOPY    _______________    OFFICE [facility]__[phone]______________________    FOLLOW UP APPOINTMENT AS NEEDED. REPEAT PROCEDURE IN _5_YEARS OR AS NEEDED. TEST ORDERED: NONE. What to expect at home: Your Recovery   Your doctor will tell you when you can eat and do your other usual activities Your doctor will talk to you about when you will need your next colonoscopy. Your doctor can help you decide how often you need to be checked. This will depend on the results of your test and your risk for colorectal cancer. After the test, you may be bloated or have gas pains. You may need to pass gas. If a biopsy was done or a polyp was removed, you may have streaks of blood in your stool (feces) for a few days. This care sheet gives you a general idea about how long it will take for you to recover. But each person recovers at a different pace. Follow the steps below to get better as quickly as possible. How can you care for yourself at home? Activity  Rest when you feel tired. Diet  Follow your doctor's directions for eating. Unless your doctor has told you not to, drink plenty of fluids. This helps to replace the fluids that were lost during the colon prep. DO NOT DRINK ALCOHOL. Medicines  Your doctor will tell you if and when you can restart your medicines. He or she will also give you instructions about taking any new medicines. If you take blood thinners, such as warfarin (Coumadin), clopidogrel (Plavix), or aspirin, be sure to talk to your doctor. He or she will tell you if and when to start taking those medicines again. Make sure that you understand exactly what your doctor wants you to do. If polyps were removed or a biopsy was done during the test, your doctor may tell you not to take aspirin or other anti-inflammatory medicines for a few days. These include ibuprofen (Advil, Motrin) and naproxen (Aleve).   Other instructions: Anethesia  For your safety, do not drive or operate

## 2023-07-27 NOTE — ANESTHESIA PRE PROCEDURE
Department of Anesthesiology  Preprocedure Note       Name:  Garth Knutson   Age:  77 y.o.  :  1955                                          MRN:  [id number]         Date:  2023      Surgeon: Troy Piña):  Shi Watson MD    Procedure: Procedure(s):  COLONOSCOPY DIAGNOSTIC    Medications prior to admission:   Prior to Admission medications    Medication Sig Start Date End Date Taking? Authorizing Provider   albuterol sulfate HFA (PROVENTIL;VENTOLIN;PROAIR) 108 (90 Base) MCG/ACT inhaler Inhale 2 puffs into the lungs every 6 hours as needed for Wheezing   Yes Historical Provider, MD   fluticasone furoate-vilanterol (BREO ELLIPTA) 100-25 MCG/ACT inhaler Inhale 1 puff into the lungs daily   Yes Historical Provider, MD   apixaban (ELIQUIS) 5 MG TABS tablet Take by mouth 2 times daily   Yes Historical Provider, MD   losartan (COZAAR) 25 MG tablet Take 25 mg by mouth daily    Historical Provider, MD   fexofenadine (ALLEGRA) 180 MG tablet Take 1 tablet by mouth daily    Historical Provider, MD   Acetaminophen (TYLENOL ARTHRITIS PAIN PO) Take by mouth    Historical Provider, MD   donepezil (ARICEPT) 10 MG tablet Take 1 tablet by mouth nightly 3/3/22   JAIME Vasquez - NP   memantine (NAMENDA) 10 MG tablet Take 1 tablet by mouth 2 times daily 3/3/22   JAIME Vasquez NP   atorvastatin (LIPITOR) 40 MG tablet Take 1 tablet by mouth nightly    Historical Provider, MD   pantoprazole (PROTONIX) 40 MG tablet Take 1 tablet by mouth daily    Historical Provider, MD   aspirin 81 MG chewable tablet Take 1 tablet by mouth daily 10/3/16   JAIME Harris CNP   warfarin (COUMADIN) 5 MG tablet Take 1 tablet by mouth daily 10/3/16   JAIME Harris CNP   gabapentin (NEURONTIN) 300 MG capsule Take 2 capsules by mouth 2 times daily  Patient taking differently: Take 2 capsules by mouth 2 times daily as needed.  10/3/16   JAIME Jaramillo CNP   Ascorbic Acid (VITAMIN C) 250 MG tablet Take 4 tablets by

## 2023-07-27 NOTE — PROGRESS NOTES
1040  Pt back to Same Day from Endo per cart. Pt is a little sleepy but arouses easily to name. VS rechecked and stable. Report received from [facility]. Pt given soda to drink per request.  Daughter to bedside. 1110 VS remain stable. Skin W&D. Pt tolerating drink w/o nausea. \  1125 IV dc'd and pt assisted up to bathroom. Voided w/o difficulty. 36 Pt and daughter instructed for discharge care and follow-up with understanding voiced. 1140  pt to car at exit per wheelchair by volunteer. Jimmie Closs

## 2023-07-27 NOTE — H&P
[facility]  Pre-operative History and Physical    Patient: Shaniqua Jackson  : 1955  Acct#:       ASSESSMENT AND PLAN:    1. Patient is a 77 y.o. female here for procedure: with deep sedation  - Colonoscopy: Alternating BM    2. Procedure options, risks and benefits reviewed with patient. Patient expresses understanding. Kevan Goddard MD  [facility]  2023  8:32 AM      HISTORY OF PRESENT ILLNESS:    The patient is a 77 y.o. female with significant past medical history as below who presents for procedure. Indication: Alternating BM    History Obtained From:  Patient and review of all records    Past Medical History:        Diagnosis Date    Arthritis     in    Cervical herniated disc     lumbar    COPD (chronic obstructive pulmonary disease) ([address])     Diabetes mellitus ([address])     controlled with diet    Fibromyalgia     Heart murmur     Hyperlipidemia     Hypertension     Migraines     PONV (postoperative nausea and vomiting)     Sleep apnea     Thyroid disease     Unspecified cerebral artery occlusion with cerebral infarction     cva x 3       Past Surgical History:        Procedure Laterality Date    APPENDECTOMY      BACK SURGERY  rods and fusions in the back    CHOLECYSTECTOMY, LAPAROSCOPIC  2014    COLONOSCOPY      EYE SURGERY Bilateral     cataracts    HYSTERECTOMY (CERVIX STATUS UNKNOWN)      JOINT REPLACEMENT      left knee    SPINAL CORD STIMULATOR SURGERY      removed. TONSILLECTOMY           Current Medications:    Medications    Prior to Admission medications    Medication Sig Start Date End Date Taking?  Authorizing Provider   albuterol sulfate HFA (PROVENTIL;VENTOLIN;PROAIR) 108 (90 Base) MCG/ACT inhaler Inhale 2 puffs into the lungs every 6 hours as needed for Wheezing   Yes Historical Provider, MD   fluticasone furoate-vilanterol (BREO ELLIPTA) 100-25 MCG/ACT inhaler Inhale 1 puff into the lungs daily   Yes Historical Provider, MD   apixaban (ELIQUIS) 5 MG TABS

## 2023-07-27 NOTE — PROGRESS NOTES
Received report from Michaela Christina. Patient alert and oriented. Verified patient's name, , allergies and procedure. No beta blockers. Eliquis was taken last on Monday. Reviewed implants. H&P on chart. Consents completed.

## 2023-09-06 ENCOUNTER — OFFICE VISIT (OUTPATIENT)
Dept: NEUROLOGY | Age: 68
End: 2023-09-06
Payer: MEDICARE

## 2023-09-06 VITALS
BODY MASS INDEX: 40.48 KG/M2 | OXYGEN SATURATION: 97 % | HEIGHT: 62 IN | HEART RATE: 78 BPM | SYSTOLIC BLOOD PRESSURE: 160 MMHG | WEIGHT: 220 LBS | DIASTOLIC BLOOD PRESSURE: 80 MMHG

## 2023-09-06 DIAGNOSIS — G47.30 SLEEP APNEA, UNSPECIFIED TYPE: ICD-10-CM

## 2023-09-06 DIAGNOSIS — R53.1 ACUTE LEFT-SIDED WEAKNESS: ICD-10-CM

## 2023-09-06 DIAGNOSIS — Z86.79 HISTORY OF CEREBRAL ARTERY OCCLUSION: Chronic | ICD-10-CM

## 2023-09-06 DIAGNOSIS — Z86.73 HISTORY OF CVA (CEREBROVASCULAR ACCIDENT): Primary | ICD-10-CM

## 2023-09-06 PROCEDURE — 3079F DIAST BP 80-89 MM HG: CPT | Performed by: NURSE PRACTITIONER

## 2023-09-06 PROCEDURE — 1123F ACP DISCUSS/DSCN MKR DOCD: CPT | Performed by: NURSE PRACTITIONER

## 2023-09-06 PROCEDURE — 3077F SYST BP >= 140 MM HG: CPT | Performed by: NURSE PRACTITIONER

## 2023-09-06 PROCEDURE — 99215 OFFICE O/P EST HI 40 MIN: CPT | Performed by: NURSE PRACTITIONER

## 2023-09-06 RX ORDER — LOSARTAN POTASSIUM 50 MG/1
50 TABLET ORAL DAILY
Status: ON HOLD | COMMUNITY
End: 2023-09-09 | Stop reason: SDUPTHER

## 2023-09-06 NOTE — PROGRESS NOTES
9/6/23    Pedro Luissal Marcano  1955    Chief Complaint   Patient presents with    Follow-up     Hx CVA       History of Present Illness  Johana Valentin is a 77 y.o. female presenting today for follow-up of: History of CVA. She remains on Eliquis and statin for secondary stroke prevention and management of her carotid stenosis. States she had a TIA last week, she suddenly got confused and her left leg started dragging. Her symptoms lasted a few hours. She calls it \"intense confusion\" she got home from work and was watching  TV and she could not understand what people were saying on the television. Then she noticed that her left leg is more weak than normal. She does have left sided weakness from her previous stroke. She has not missed any doses of Eliquis or statin since July when she had a colonoscopy. She has an appointment with her cardiologist next week. Recent LDL was 43, A1C was around 6 she tells me. She is compliant with her CPAP. Her dysphagia has resolved. She works as an .        Current Outpatient Medications   Medication Sig Dispense Refill    losartan (COZAAR) 50 MG tablet Take 1 tablet by mouth daily      albuterol sulfate HFA (PROVENTIL;VENTOLIN;PROAIR) 108 (90 Base) MCG/ACT inhaler Inhale 2 puffs into the lungs every 6 hours as needed for Wheezing      fluticasone furoate-vilanterol (BREO ELLIPTA) 100-25 MCG/ACT inhaler Inhale 1 puff into the lungs daily      apixaban (ELIQUIS) 5 MG TABS tablet Take by mouth 2 times daily      fexofenadine (ALLEGRA) 180 MG tablet Take 1 tablet by mouth daily      Acetaminophen (TYLENOL ARTHRITIS PAIN PO) Take by mouth      atorvastatin (LIPITOR) 40 MG tablet Take 1 tablet by mouth nightly      pantoprazole (PROTONIX) 40 MG tablet Take 1 tablet by mouth daily      gabapentin (NEURONTIN) 300 MG capsule Take 2 capsules by mouth 2 times daily (Patient taking differently: Take 2 capsules by mouth 2 times daily as needed.) 90 capsule 3

## 2023-09-07 ENCOUNTER — APPOINTMENT (OUTPATIENT)
Dept: CT IMAGING | Age: 68
End: 2023-09-07
Payer: MEDICARE

## 2023-09-07 ENCOUNTER — APPOINTMENT (OUTPATIENT)
Dept: MRI IMAGING | Age: 68
End: 2023-09-07
Payer: MEDICARE

## 2023-09-07 ENCOUNTER — APPOINTMENT (OUTPATIENT)
Dept: GENERAL RADIOLOGY | Age: 68
End: 2023-09-07
Payer: MEDICARE

## 2023-09-07 ENCOUNTER — HOSPITAL ENCOUNTER (OUTPATIENT)
Age: 68
Setting detail: OBSERVATION
Discharge: HOME | End: 2023-09-09
Attending: EMERGENCY MEDICINE | Admitting: STUDENT IN AN ORGANIZED HEALTH CARE EDUCATION/TRAINING PROGRAM
Payer: MEDICARE

## 2023-09-07 DIAGNOSIS — R29.898 LEFT LEG WEAKNESS: ICD-10-CM

## 2023-09-07 DIAGNOSIS — R20.0 FACIAL NUMBNESS: Primary | ICD-10-CM

## 2023-09-07 DIAGNOSIS — Z86.73 H/O: STROKE: ICD-10-CM

## 2023-09-07 DIAGNOSIS — I16.0 HYPERTENSIVE URGENCY: ICD-10-CM

## 2023-09-07 PROBLEM — I63.9 ACUTE CVA (CEREBROVASCULAR ACCIDENT) (HCC): Status: ACTIVE | Noted: 2023-09-07

## 2023-09-07 LAB
ALBUMIN SERPL-MCNC: 4.5 GM/DL (ref 3.4–5)
ALP BLD-CCNC: 111 IU/L (ref 40–128)
ALT SERPL-CCNC: 21 U/L (ref 10–40)
ANION GAP SERPL CALCULATED.3IONS-SCNC: 15 MMOL/L (ref 4–16)
AST SERPL-CCNC: 19 IU/L (ref 15–37)
BASOPHILS ABSOLUTE: 0.1 K/CU MM
BASOPHILS RELATIVE PERCENT: 0.5 % (ref 0–1)
BILIRUB SERPL-MCNC: 0.4 MG/DL (ref 0–1)
BUN SERPL-MCNC: 18 MG/DL (ref 6–23)
CALCIUM SERPL-MCNC: 9.7 MG/DL (ref 8.3–10.6)
CHLORIDE BLD-SCNC: 101 MMOL/L (ref 99–110)
CO2: 22 MMOL/L (ref 21–32)
CREAT SERPL-MCNC: 0.8 MG/DL (ref 0.6–1.1)
DIFFERENTIAL TYPE: ABNORMAL
EKG ATRIAL RATE: 82 BPM
EKG DIAGNOSIS: NORMAL
EKG P AXIS: 68 DEGREES
EKG P-R INTERVAL: 176 MS
EKG Q-T INTERVAL: 366 MS
EKG QRS DURATION: 68 MS
EKG QTC CALCULATION (BAZETT): 427 MS
EKG R AXIS: 7 DEGREES
EKG T AXIS: 21 DEGREES
EKG VENTRICULAR RATE: 82 BPM
EOSINOPHILS ABSOLUTE: 0.1 K/CU MM
EOSINOPHILS RELATIVE PERCENT: 0.9 % (ref 0–3)
GFR SERPL CREATININE-BSD FRML MDRD: >60 ML/MIN/1.73M2
GLUCOSE BLD-MCNC: 110 MG/DL
GLUCOSE BLD-MCNC: 110 MG/DL (ref 70–99)
GLUCOSE SERPL-MCNC: 112 MG/DL (ref 70–99)
HCT VFR BLD CALC: 43.9 % (ref 37–47)
HEMOGLOBIN: 14.2 GM/DL (ref 12.5–16)
IMMATURE NEUTROPHIL %: 0.5 % (ref 0–0.43)
INR BLD: 1.1 INDEX
LYMPHOCYTES ABSOLUTE: 1.9 K/CU MM
LYMPHOCYTES RELATIVE PERCENT: 18.3 % (ref 24–44)
MCH RBC QN AUTO: 31.1 PG (ref 27–31)
MCHC RBC AUTO-ENTMCNC: 32.3 % (ref 32–36)
MCV RBC AUTO: 96.3 FL (ref 78–100)
MONOCYTES ABSOLUTE: 0.6 K/CU MM
MONOCYTES RELATIVE PERCENT: 5.9 % (ref 0–4)
NUCLEATED RBC %: 0 %
PDW BLD-RTO: 13.8 % (ref 11.7–14.9)
PLATELET # BLD: 225 K/CU MM (ref 140–440)
PMV BLD AUTO: 10.9 FL (ref 7.5–11.1)
POTASSIUM SERPL-SCNC: 4.1 MMOL/L (ref 3.5–5.1)
PROTHROMBIN TIME: 14.6 SECONDS (ref 11.7–14.5)
RBC # BLD: 4.56 M/CU MM (ref 4.2–5.4)
SEGMENTED NEUTROPHILS ABSOLUTE COUNT: 7.5 K/CU MM
SEGMENTED NEUTROPHILS RELATIVE PERCENT: 73.9 % (ref 36–66)
SODIUM BLD-SCNC: 138 MMOL/L (ref 135–145)
TOTAL IMMATURE NEUTOROPHIL: 0.05 K/CU MM
TOTAL NUCLEATED RBC: 0 K/CU MM
TOTAL PROTEIN: 6.7 GM/DL (ref 6.4–8.2)
TROPONIN T: <0.01 NG/ML
WBC # BLD: 10.2 K/CU MM (ref 4–10.5)

## 2023-09-07 PROCEDURE — 94761 N-INVAS EAR/PLS OXIMETRY MLT: CPT

## 2023-09-07 PROCEDURE — 96375 TX/PRO/DX INJ NEW DRUG ADDON: CPT

## 2023-09-07 PROCEDURE — 85025 COMPLETE CBC W/AUTO DIFF WBC: CPT

## 2023-09-07 PROCEDURE — 99285 EMERGENCY DEPT VISIT HI MDM: CPT

## 2023-09-07 PROCEDURE — G0378 HOSPITAL OBSERVATION PER HR: HCPCS

## 2023-09-07 PROCEDURE — 84484 ASSAY OF TROPONIN QUANT: CPT

## 2023-09-07 PROCEDURE — 6370000000 HC RX 637 (ALT 250 FOR IP): Performed by: PHYSICIAN ASSISTANT

## 2023-09-07 PROCEDURE — 71045 X-RAY EXAM CHEST 1 VIEW: CPT

## 2023-09-07 PROCEDURE — 96376 TX/PRO/DX INJ SAME DRUG ADON: CPT

## 2023-09-07 PROCEDURE — 93010 ELECTROCARDIOGRAM REPORT: CPT | Performed by: INTERNAL MEDICINE

## 2023-09-07 PROCEDURE — 93005 ELECTROCARDIOGRAM TRACING: CPT | Performed by: EMERGENCY MEDICINE

## 2023-09-07 PROCEDURE — 92610 EVALUATE SWALLOWING FUNCTION: CPT

## 2023-09-07 PROCEDURE — 6360000002 HC RX W HCPCS: Performed by: EMERGENCY MEDICINE

## 2023-09-07 PROCEDURE — 6360000002 HC RX W HCPCS: Performed by: PHYSICIAN ASSISTANT

## 2023-09-07 PROCEDURE — 85610 PROTHROMBIN TIME: CPT

## 2023-09-07 PROCEDURE — 82962 GLUCOSE BLOOD TEST: CPT

## 2023-09-07 PROCEDURE — 80053 COMPREHEN METABOLIC PANEL: CPT

## 2023-09-07 PROCEDURE — 70551 MRI BRAIN STEM W/O DYE: CPT

## 2023-09-07 PROCEDURE — 6360000002 HC RX W HCPCS: Performed by: NURSE PRACTITIONER

## 2023-09-07 PROCEDURE — 2580000003 HC RX 258: Performed by: PHYSICIAN ASSISTANT

## 2023-09-07 PROCEDURE — 70450 CT HEAD/BRAIN W/O DYE: CPT

## 2023-09-07 PROCEDURE — 6360000004 HC RX CONTRAST MEDICATION: Performed by: EMERGENCY MEDICINE

## 2023-09-07 PROCEDURE — 93306 TTE W/DOPPLER COMPLETE: CPT

## 2023-09-07 PROCEDURE — 70498 CT ANGIOGRAPHY NECK: CPT

## 2023-09-07 RX ORDER — DIPHENHYDRAMINE HYDROCHLORIDE 50 MG/ML
25 INJECTION INTRAMUSCULAR; INTRAVENOUS ONCE
Status: DISCONTINUED | OUTPATIENT
Start: 2023-09-07 | End: 2023-09-07

## 2023-09-07 RX ORDER — HYDRALAZINE HYDROCHLORIDE 20 MG/ML
10 INJECTION INTRAMUSCULAR; INTRAVENOUS EVERY 6 HOURS PRN
Status: DISCONTINUED | OUTPATIENT
Start: 2023-09-07 | End: 2023-09-09 | Stop reason: HOSPADM

## 2023-09-07 RX ORDER — SODIUM CHLORIDE 0.9 % (FLUSH) 0.9 %
5-40 SYRINGE (ML) INJECTION PRN
Status: DISCONTINUED | OUTPATIENT
Start: 2023-09-07 | End: 2023-09-09 | Stop reason: HOSPADM

## 2023-09-07 RX ORDER — SODIUM CHLORIDE 0.9 % (FLUSH) 0.9 %
5-40 SYRINGE (ML) INJECTION EVERY 12 HOURS SCHEDULED
Status: DISCONTINUED | OUTPATIENT
Start: 2023-09-07 | End: 2023-09-09 | Stop reason: HOSPADM

## 2023-09-07 RX ORDER — KETOROLAC TROMETHAMINE 15 MG/ML
15 INJECTION, SOLUTION INTRAMUSCULAR; INTRAVENOUS ONCE
Status: COMPLETED | OUTPATIENT
Start: 2023-09-07 | End: 2023-09-07

## 2023-09-07 RX ORDER — ASPIRIN 81 MG/1
81 TABLET, CHEWABLE ORAL DAILY
Status: CANCELLED | OUTPATIENT
Start: 2023-09-08

## 2023-09-07 RX ORDER — ATORVASTATIN CALCIUM 40 MG/1
40 TABLET, FILM COATED ORAL NIGHTLY
Status: DISCONTINUED | OUTPATIENT
Start: 2023-09-07 | End: 2023-09-09 | Stop reason: HOSPADM

## 2023-09-07 RX ORDER — HYDRALAZINE HYDROCHLORIDE 20 MG/ML
10 INJECTION INTRAMUSCULAR; INTRAVENOUS ONCE
Status: COMPLETED | OUTPATIENT
Start: 2023-09-07 | End: 2023-09-07

## 2023-09-07 RX ORDER — ONDANSETRON 2 MG/ML
4 INJECTION INTRAMUSCULAR; INTRAVENOUS EVERY 6 HOURS PRN
Status: DISCONTINUED | OUTPATIENT
Start: 2023-09-07 | End: 2023-09-09 | Stop reason: HOSPADM

## 2023-09-07 RX ORDER — SODIUM CHLORIDE 9 MG/ML
INJECTION, SOLUTION INTRAVENOUS PRN
Status: DISCONTINUED | OUTPATIENT
Start: 2023-09-07 | End: 2023-09-09 | Stop reason: HOSPADM

## 2023-09-07 RX ORDER — ASPIRIN 300 MG/1
300 SUPPOSITORY RECTAL DAILY
Status: CANCELLED | OUTPATIENT
Start: 2023-09-08

## 2023-09-07 RX ORDER — LOSARTAN POTASSIUM 25 MG/1
50 TABLET ORAL DAILY
Status: DISCONTINUED | OUTPATIENT
Start: 2023-09-07 | End: 2023-09-08

## 2023-09-07 RX ORDER — ALBUTEROL SULFATE 90 UG/1
2 AEROSOL, METERED RESPIRATORY (INHALATION) EVERY 6 HOURS PRN
Status: DISCONTINUED | OUTPATIENT
Start: 2023-09-07 | End: 2023-09-09 | Stop reason: HOSPADM

## 2023-09-07 RX ORDER — DILTIAZEM HYDROCHLORIDE 180 MG/1
180 CAPSULE, COATED, EXTENDED RELEASE ORAL DAILY
Status: DISCONTINUED | OUTPATIENT
Start: 2023-09-08 | End: 2023-09-09

## 2023-09-07 RX ORDER — ONDANSETRON 4 MG/1
4 TABLET, ORALLY DISINTEGRATING ORAL EVERY 8 HOURS PRN
Status: DISCONTINUED | OUTPATIENT
Start: 2023-09-07 | End: 2023-09-09 | Stop reason: HOSPADM

## 2023-09-07 RX ORDER — PANTOPRAZOLE SODIUM 40 MG/1
40 TABLET, DELAYED RELEASE ORAL DAILY
Status: DISCONTINUED | OUTPATIENT
Start: 2023-09-08 | End: 2023-09-09 | Stop reason: HOSPADM

## 2023-09-07 RX ORDER — GABAPENTIN 300 MG/1
600 CAPSULE ORAL NIGHTLY
Status: DISCONTINUED | OUTPATIENT
Start: 2023-09-07 | End: 2023-09-09 | Stop reason: HOSPADM

## 2023-09-07 RX ORDER — MORPHINE SULFATE 2 MG/ML
2 INJECTION, SOLUTION INTRAMUSCULAR; INTRAVENOUS ONCE
Status: COMPLETED | OUTPATIENT
Start: 2023-09-07 | End: 2023-09-07

## 2023-09-07 RX ORDER — POLYETHYLENE GLYCOL 3350 17 G/17G
17 POWDER, FOR SOLUTION ORAL DAILY PRN
Status: DISCONTINUED | OUTPATIENT
Start: 2023-09-07 | End: 2023-09-09 | Stop reason: HOSPADM

## 2023-09-07 RX ADMIN — ATORVASTATIN CALCIUM 40 MG: 40 TABLET, FILM COATED ORAL at 20:13

## 2023-09-07 RX ADMIN — IOPAMIDOL 80 ML: 755 INJECTION, SOLUTION INTRAVENOUS at 12:26

## 2023-09-07 RX ADMIN — KETOROLAC TROMETHAMINE 15 MG: 15 INJECTION, SOLUTION INTRAMUSCULAR; INTRAVENOUS at 20:13

## 2023-09-07 RX ADMIN — APIXABAN 5 MG: 5 TABLET, FILM COATED ORAL at 20:13

## 2023-09-07 RX ADMIN — GABAPENTIN 600 MG: 300 CAPSULE ORAL at 20:13

## 2023-09-07 RX ADMIN — HYDRALAZINE HYDROCHLORIDE 10 MG: 20 INJECTION INTRAMUSCULAR; INTRAVENOUS at 17:28

## 2023-09-07 RX ADMIN — HYDRALAZINE HYDROCHLORIDE 10 MG: 20 INJECTION INTRAMUSCULAR; INTRAVENOUS at 14:04

## 2023-09-07 RX ADMIN — MORPHINE SULFATE 2 MG: 2 INJECTION, SOLUTION INTRAMUSCULAR; INTRAVENOUS at 22:19

## 2023-09-07 RX ADMIN — SODIUM CHLORIDE, PRESERVATIVE FREE 10 ML: 5 INJECTION INTRAVENOUS at 20:15

## 2023-09-07 ASSESSMENT — PAIN SCALES - GENERAL
PAINLEVEL_OUTOF10: 10
PAINLEVEL_OUTOF10: 10
PAINLEVEL_OUTOF10: 8

## 2023-09-07 ASSESSMENT — PAIN DESCRIPTION - PAIN TYPE: TYPE: ACUTE PAIN

## 2023-09-07 ASSESSMENT — PAIN - FUNCTIONAL ASSESSMENT: PAIN_FUNCTIONAL_ASSESSMENT: PREVENTS OR INTERFERES SOME ACTIVE ACTIVITIES AND ADLS

## 2023-09-07 ASSESSMENT — PAIN DESCRIPTION - LOCATION
LOCATION: HEAD
LOCATION: HEAD

## 2023-09-07 ASSESSMENT — PAIN DESCRIPTION - ORIENTATION
ORIENTATION: ANTERIOR;UPPER
ORIENTATION: ANTERIOR

## 2023-09-07 ASSESSMENT — PAIN DESCRIPTION - DESCRIPTORS
DESCRIPTORS: ACHING
DESCRIPTORS: ACHING

## 2023-09-07 ASSESSMENT — LIFESTYLE VARIABLES
HOW MANY STANDARD DRINKS CONTAINING ALCOHOL DO YOU HAVE ON A TYPICAL DAY: PATIENT DOES NOT DRINK
HOW OFTEN DO YOU HAVE A DRINK CONTAINING ALCOHOL: NEVER

## 2023-09-07 NOTE — ED TRIAGE NOTES
Pt arrived to the Ed with complaints of left sided facial droop and numbness that started 1120 am.  Stroke alert called on pt. Pt states she had an episode of of left sided weakness a few days ago where her foot was dragging.

## 2023-09-07 NOTE — PROGRESS NOTES
4 Eyes Skin Assessment     NAME:  Haritha Taveras  YOB: 1955  MEDICAL RECORD NUMBER:  [id number]    The patient is being assessed for  Admission    I agree that at least one RN has performed a thorough Head to Toe Skin Assessment on the patient. ALL assessment sites listed below have been assessed. Areas assessed by both nurses:    Head, Face, Ears, Shoulders, Back, Chest, Arms, Elbows, Hands, Sacrum. Buttock, Coccyx, Ischium, and Legs. Feet and Heels        Does the Patient have a Wound?  No noted wound(s)       Naveen Prevention initiated by RN: No  Wound Care Orders initiated by RN: No    Pressure Injury (Stage 3,4, Unstageable, DTI, NWPT, and Complex wounds) if present, place Wound referral order by RN under : No    New Ostomies, if present place, Ostomy referral order under : No     Nurse 1 eSignature: Electronically signed by Romero Dallas RN on 9/7/23 at 7:10 PM EDT    **SHARE this note so that the co-signing nurse can place an eSignature**    Nurse 2 eSignature: Electronically signed by Sonido Jin RN on 9/7/23 at 7:17 PM EDT

## 2023-09-07 NOTE — ED NOTES
ED TO INPATIENT SBAR HANDOFF    Patient Name: Ning Delaney   :  1955  77 y.o. Preferred Name  Adwoa Rai  Family/Caregiver Present no   Restraints no   C-SSRS:    Sitter no   Sepsis Risk Score Sepsis Risk Score: 0.85      Situation  Chief Complaint   Patient presents with    Numbness     Numbness in left side of face x30 minutes     Brief Description of Patient's Condition: pt brought in for stroke work up. Pt appears to be doing better in ED. BP is elevated and 10 mg hydralazine given in ED. Pt does not appear to be symptomatic from BP. Pt states she took her bp meds this morning. Pending MRI. Mental Status: oriented, alert, coherent, logical, thought processes intact, and able to concentrate and follow conversation  Arrived from: home    Imaging:   XR CHEST PORTABLE   Final Result   No acute abnormality. CTA HEAD NECK W CONTRAST   Preliminary Result   No acute abnormality or flow-limiting stenosis of the major arteries of the   head and neck. CT HEAD WO CONTRAST   Final Result   Addendum (preliminary)    ADDENDUM:   Results were reported to Dr. Susanne Feng by radiology results communication at   12:42 p.m. on 2023. Final   No acute intracranial abnormality. Old infarction in the left cerebellar hemisphere.            Abnormal labs:   Abnormal Labs Reviewed   CBC WITH AUTO DIFFERENTIAL - Abnormal; Notable for the following components:       Result Value    MCH 31.1 (*)     Segs Relative 73.9 (*)     Lymphocytes % 18.3 (*)     Monocytes % 5.9 (*)     Immature Neutrophil % 0.5 (*)     All other components within normal limits   COMPREHENSIVE METABOLIC PANEL W/ REFLEX TO MG FOR LOW K - Abnormal; Notable for the following components:    Glucose 112 (*)     All other components within normal limits   PROTIME-INR - Abnormal; Notable for the following components:    Protime 14.6 (*)     All other components within normal limits   POCT GLUCOSE - Abnormal; Notable

## 2023-09-07 NOTE — PROGRESS NOTES
Speech-Language Pathology Department   Facility/Department: [facility] OBSERVATION   CLINICAL BEDSIDE SWALLOW EVALUATION    NAME: Sherie Marcano  : 1955  MRN: [id number]    ADMISSION DATE: 2023  ADMITTING DIAGNOSIS: has History of cerebral artery occlusion; Acute left-sided weakness; Essential hypertension; Fibromyalgia; Sleep apnea; and Acute CVA (cerebrovascular accident) ([address]) on their problem list.      Impressions: Sherie Marcano was seen for a bedside swallowing evaluation after being admitted to [facility] with left-sided weakness. Pt was alert and cooperative throughout assessment. Relevant medical hx includes hypertension, CVA with residual left-sided weakness and fibromyalgia. Pt denies any hx of dysphagia PTA. Pt was positioned upright in bed and presented with a clear vocal quality and strong volitional cough. Oral mechanism examination indicated slight left-sided asymmetry, with adequate strength and coordination. Oropharyngeal swallow is intact for trials of regular solids and thin liquids. Swallow initiation appears timely with adequate laryngeal elevation and 0 overt s/s aspiration are observed across all textures. Receptive/expressive language, motor speech and cognition screened informally throughout evaluation and judged to be [facility]. Recommend regular solids/thin liquids with aspiration precautions. No further acute SLP needs were identified at this time.         ONSET DATE: this admission     Date of Eval: 2023  Evaluating Therapist: ROSA Huerta    Current Diet level:  Current Diet : NPO  Current Liquid Diet : NPO    Primary Complaint  weakness    Pain:       Reason for Referral  Sherie Marcano was referred for a bedside swallow evaluation to assess the efficiency of her swallow function, identify signs and symptoms of aspiration and make recommendations regarding safe dietary consistencies, effective compensatory strategies, and safe eating environment. Impression  Dysphagia Diagnosis: Swallow function appears WFL;No clinical indicators of dysphagia  Dysphagia Outcome Severity Scale: Level 6: Within functional limits/Modified independence     Treatment Plan  Requires SLP Intervention: No  Duration of Treatment: n/a  D/C Recommendations: To be determined       Recommended Diet and Intervention  Diet Solids Recommendation: Regular  Liquid Consistency Recommendation: Thin  Recommended Form of Meds: PO          Compensatory Swallowing Strategies  Compensatory Swallowing Strategies : Upright as possible for all oral intake;Eat/Feed slowly    Treatment/Goals  Short-term Goals  Timeframe for Short-term Goals: n/a    General  Chart Reviewed: Yes  Behavior/Cognition: Alert; Cooperative;Pleasant mood  Respiratory Status: Room air  O2 Device: None (Room air)  Communication Observation: Functional  Follows Directions: Complex  Dentition: Adequate  Patient Positioning: Upright in bed  Baseline Vocal Quality: Normal  Volitional Cough: Strong  Prior Dysphagia History: Pt denies hx of dysphagia    Vision/Hearing       Oral Motor Deficits  Labial: Left droop (slight asymmetry)    Oral Phase Dysfunction  Oral Phase  Oral Phase: WFL     Indicators of Pharyngeal Phase Dysfunction   Pharyngeal Phase  Pharyngeal Phase: WFL  Pharyngeal Phase   Pharyngeal Phase: WFL    Prognosis       Education  Patient Education: recommendations/POC  Patient Education Response: Verbalizes understanding             Therapy Time  SLP Individual Minutes  Time In: 1450  Time Out: 1325 [facility]  Minutes: 4927 [facility], [address] [facility] Joselo [facility]-SLP, 9/7/2023

## 2023-09-07 NOTE — H&P
V2.0  History and Physical      Name:  Kyle Maldonado /Age/Sex: 1955  (77 y.o. female)   MRN & CSN:  [id number] & [id number] Encounter Date/Time: 2023 1:08 PM EDT   Location:  [place] [address] PCP: Lesly Pierce [address] [place] [address] Day: 1    Assessment and Plan:   Kyle Maldonado is a 77 y.o. female with a past medical history of CVA, COPD, diabetes, HLD, hypertension who presents with strokelike symptoms. Stroke-like symptoms - r/o CVA/TIA  -Has residual left-sided weakness from previous CVA  - LKW ~1100 23  -Presented with ~1 week of intermittent confusion, increased left-sided weakness, then developed left facial paresthesias while at work this a.m.  - CT head no acute process, old infarction in the left cerebellar hemisphere  - CTA head and neck no acute process  - stroke alert in ED, [facility] stroke neurology consulted, recommended SBP <180, continue Eliquis  - admit NIHSS 4  - continue neuro checks  - NPO until bedside swallow eval   - PT/OT/SLP   - monitor on telemetry   - continue Eliquis per neuro recs  - MRI brain, echo, FLP, HgbA1c ordered   - neurology consulted, appreciate recs    Hypertension  - continue cardizem  - PRN hydralazine for SBP >180  - goal BP <180 per [facility] neurology     HLD  -Continue statin    History of CVA  -With residual left-sided weakness  -Continue Eliquis, statin      This patient was seen and examined in conjunction with Dr. Shawnee Xiong. She was agreeable with the assessment and plan as dictated above. Disposition:   Current Living situation: home alone  Expected Disposition: likely same  Estimated D/C: 1-2 days    Diet Diet NPO   DVT Prophylaxis [] Lovenox, []  Heparin, [] SCDs, [] Ambulation,  [x] Eliquis, [] Xarelto []  Coumadin   Code Status Prior   Surrogate Decision Maker/ DOMINIK Bacon Jean Claudecarmita, sister     Personally reviewed Lab Studies including CBC, CMP, trop and Imaging     Discussed management of the case with Dr. Demetris Shahid.  Agree with decision to place patient in

## 2023-09-07 NOTE — ED TRIAGE NOTES
Pt reports having L sided facial numbness that began 30 min PTA. When asking about weakness she reports having an episode of confusion and L sided weakness last week and thought she had a TIA. Seen a neurologist yesterday and was told effects do not last that long with TIA. L sided facial numbness being new.

## 2023-09-07 NOTE — ED PROVIDER NOTES
Emergency Department Encounter    Patient: Dov Dupont  MRN: [id number]  : 1955  Date of Evaluation: 2023  ED Provider:  Daniela Armendariz MD    Triage Chief Complaint:   Numbness (Numbness in left side of face x30 minutes)    [place]:  Dov Dupont is a 77 y.o. female that presents with concern for the numbness left side of her face for the last 30 minutes. She is concerned she had a stroke. She has a history of strokes. She has had some left-sided weakness from previous strokes in the past.  About a week ago she had an episode of confusion and general malaise and had some left-sided weakness, if she feels like she still has some foot drop from that. Saw her neurologist yesterday who told her it was not a TIA because it had lasted that long, she had thought it was a TIA and that is why she had not been seen initially. Today the left-sided facial numbness started about 30 minutes prior to arrival.  No chest pain. No headache. No vision changes. ROS - see HPI, below listed is current ROS at time of my eval:  10 systems reviewed and negative except as above.      Past Medical History:   Diagnosis Date    Arthritis     in    Cervical herniated disc     lumbar    COPD (chronic obstructive pulmonary disease) (HCC)     Diabetes mellitus ([address])     controlled with diet    Fibromyalgia     Heart murmur     Hyperlipidemia     Hypertension     Migraines     PONV (postoperative nausea and vomiting)     Sleep apnea     Thyroid disease     Unspecified cerebral artery occlusion with cerebral infarction     cva x 3     Past Surgical History:   Procedure Laterality Date    APPENDECTOMY      BACK SURGERY  rods and fusions in the back    CHOLECYSTECTOMY, LAPAROSCOPIC  2014    COLONOSCOPY      COLONOSCOPY N/A 2023    COLONOSCOPY DIAGNOSTIC performed by Jenna Arce MD at [address] [place] [address] Bilateral     cataracts    HYSTERECTOMY (CERVIX STATUS UNKNOWN)      JOINT REPLACEMENT (DILACOR XR) extended release capsule 180 mg (has no administration in time range)   gabapentin (NEURONTIN) capsule 600 mg (has no administration in time range)   losartan (COZAAR) tablet 50 mg ( Oral Automatically Held 9/10/23 0900)   pantoprazole (PROTONIX) tablet 40 mg (has no administration in time range)   sodium chloride flush 0.9 % injection 5-40 mL (has no administration in time range)   sodium chloride flush 0.9 % injection 5-40 mL (has no administration in time range)   0.9 % sodium chloride infusion (has no administration in time range)   ondansetron (ZOFRAN-ODT) disintegrating tablet 4 mg (has no administration in time range)     Or   ondansetron (ZOFRAN) injection 4 mg (has no administration in time range)   polyethylene glycol (GLYCOLAX) packet 17 g (has no administration in time range)   hydrALAZINE (APRESOLINE) injection 10 mg (has no administration in time range)   iopamidol (ISOVUE-370) 76 % injection 80 mL (80 mLs IntraVENous Given 9/7/23 1226)   hydrALAZINE (APRESOLINE) injection 10 mg (10 mg IntraVENous Given 9/7/23 1404)       Independent Imaging Interpretation by me: Head CT without contrast with no evidence of hemorrhage or midline shift    EKG (if obtained): (All EKG's are interpreted by myself in the absence of a cardiologist) normal sinus rhythm with rate of 82 bpm, normal intervals. No ST elevation. Normal EKG. No previous to compare    Chronic conditions affecting care: Previous stroke, chronic anticoagulation    Discussion with Other Profesionals : Admitting Team hospitalist PA 660Bassem Jacobson excepted the patient for observation and Consultant telestroke neurology at [facility] as above    Disposition Considerations (tests considered but not done, Shared Decision Making, Pt Expectation of Test or Tx.): Patient continued to do well in the emergency department and did not have any worsening or new symptoms.   She is already on anticoagulation, sounds like she may have had an event last week versus

## 2023-09-07 NOTE — ED NOTES
Patient assessed via Telestroke by Dr. Sam Donis from [facility]. Patient states that she has continued to have weakness of the left leg for a few days. The numbness of the face is new today. Patient had trouble with the finger to nose testing with the left limb. Sensory was also less on the left side of the face. Dr. Sam Donis recommends admission for stroke work up/MRI. CTA still pending.      Mercy Casey RN  09/07/23 [id number]

## 2023-09-07 NOTE — ED NOTES
Medication History  [facility]    Patient Name: Dov Dupont 1955     Medication history has been completed by: Allen Hernandez CPhT    Source(s) of information: patient and insurance claims     Primary Care Physician: Flory Vallecillo MD     Pharmacy: [facility]    Allergies as of 09/07/2023 - Fully Reviewed 09/07/2023   Allergen Reaction Noted    Adhesive tape Rash 07/27/2023        Prior to Admission medications    Medication Sig Start Date End Date Taking? Authorizing Provider   losartan (COZAAR) 50 MG tablet Take 1 tablet by mouth daily    Historical Provider, MD   albuterol sulfate HFA (PROVENTIL;VENTOLIN;PROAIR) 108 (90 Base) MCG/ACT inhaler Inhale 2 puffs into the lungs every 6 hours as needed for Wheezing    Historical Provider, MD   fluticasone furoate-vilanterol (BREO ELLIPTA) 100-25 MCG/ACT inhaler Inhale 1 puff into the lungs daily    Historical Provider, MD   apixaban (ELIQUIS) 5 MG TABS tablet Take by mouth 2 times daily    Historical Provider, MD   fexofenadine (ALLEGRA) 180 MG tablet Take 1 tablet by mouth daily    Historical Provider, MD   Acetaminophen (TYLENOL ARTHRITIS PAIN PO) Take 2 tablets by mouth daily    Historical Provider, MD   atorvastatin (LIPITOR) 40 MG tablet Take 1 tablet by mouth nightly    Historical Provider, MD   pantoprazole (PROTONIX) 40 MG tablet Take 1 tablet by mouth daily    Historical Provider, MD   gabapentin (NEURONTIN) 300 MG capsule Take 2 capsules by mouth nightly. 10/3/16   JAIME Jaramillo - CNP   Ascorbic Acid (VITAMIN C) 250 MG tablet Take 4 tablets by mouth daily    Historical Provider, MD   diltiazem (DILACOR XR) 180 MG XR capsule Take 1 capsule by mouth daily    Historical Provider, MD   calcium carbonate-vitamin D 600-200 MG-UNIT TABS Take 1 tablet by mouth.       Historical Provider, MD   Cholecalciferol (VITAMIN D3) 50 MCG (2000 UT) TABS Take 1 tablet by mouth 50 mcg daily    Historical Provider, MD   therapeutic multivitamin-minerals (THERAGRAN-M) tablet Take 1 tablet by mouth daily Centrum silver multivitamin    Historical Provider, MD     Medications added or changed (ex. new medication, dosage change, interval change, formulation change): Losartan dosage clarified    Medications removed from list (include reason, ex. noncompliance, medication cost, therapy complete etc.):   Aspirin not taking  Warfarin on Eliquis  Vitamin B 12 not taking    Comments:  Medication list reviewed with patient and insurance claims verified. Eliquis therapy updated patient reports first dose taken today.     To my knowledge the above medication history is accurate as of 9/7/2023 12:59 PM.   Efraín Leger CPhT   9/7/2023 12:59 PM

## 2023-09-07 NOTE — PROGRESS NOTES
4 Eyes Skin Assessment     NAME:  Jose Manuel Diaz  YOB: 1955  MEDICAL RECORD NUMBER:  [id number]    The patient is being assessed for  {Reason for Assessment:24179}    I agree that at least one RN has performed a thorough Head to Toe Skin Assessment on the patient. ALL assessment sites listed below have been assessed. Areas assessed by both nurses:    {Pressure Areas Assessed:92328}        Does the Patient have a Wound?  {Action PPRIR:39428}       Naveen Prevention initiated by RN: {YES/NO:19726}  Wound Care Orders initiated by RN: {YES/NO:19726}    Pressure Injury (Stage 3,4, Unstageable, DTI, NWPT, and Complex wounds) if present, place Wound referral order by RN under : {YES/NO:19726}    New Ostomies, if present place, Ostomy referral order under : {YES/NO:19726}     Nurse 1 eSignature: Electronically signed by Kanwal Chacon RN on 9/7/23 at 3:53 PM EDT    **SHARE this note so that the co-signing nurse can place an eSignature**    Nurse 2 eSignature: {Esignature:[id number]}

## 2023-09-08 PROBLEM — R29.898 LEFT LEG WEAKNESS: Status: ACTIVE | Noted: 2023-09-07

## 2023-09-08 LAB
BILIRUBIN URINE: NEGATIVE MG/DL
BLOOD, URINE: NEGATIVE
CHOLEST SERPL-MCNC: 113 MG/DL
CLARITY: CLEAR
COLOR: YELLOW
COMMENT UA: ABNORMAL
ESTIMATED AVERAGE GLUCOSE: 157 MG/DL
GLUCOSE, URINE: NEGATIVE MG/DL
HBA1C MFR BLD: 7.1 % (ref 4.2–6.3)
HCT VFR BLD CALC: 44.4 % (ref 37–47)
HDLC SERPL-MCNC: 58 MG/DL
HEMOGLOBIN: 13.6 GM/DL (ref 12.5–16)
KETONES, URINE: ABNORMAL MG/DL
LDLC SERPL CALC-MCNC: 43 MG/DL
LEUKOCYTE ESTERASE, URINE: NEGATIVE
MCH RBC QN AUTO: 30.7 PG (ref 27–31)
MCHC RBC AUTO-ENTMCNC: 30.6 % (ref 32–36)
MCV RBC AUTO: 100.2 FL (ref 78–100)
NITRITE URINE, QUANTITATIVE: NEGATIVE
PDW BLD-RTO: 14 % (ref 11.7–14.9)
PH, URINE: 6 (ref 5–8)
PLATELET # BLD: 212 K/CU MM (ref 140–440)
PMV BLD AUTO: 10.9 FL (ref 7.5–11.1)
PROTEIN UA: NEGATIVE MG/DL
RBC # BLD: 4.43 M/CU MM (ref 4.2–5.4)
SPECIFIC GRAVITY UA: 1.02 (ref 1–1.03)
TRIGL SERPL-MCNC: 62 MG/DL
UROBILINOGEN, URINE: 1 MG/DL (ref 0.2–1)
WBC # BLD: 8.2 K/CU MM (ref 4–10.5)

## 2023-09-08 PROCEDURE — 2580000003 HC RX 258: Performed by: NURSE PRACTITIONER

## 2023-09-08 PROCEDURE — 81003 URINALYSIS AUTO W/O SCOPE: CPT

## 2023-09-08 PROCEDURE — 80061 LIPID PANEL: CPT

## 2023-09-08 PROCEDURE — 97116 GAIT TRAINING THERAPY: CPT

## 2023-09-08 PROCEDURE — 6370000000 HC RX 637 (ALT 250 FOR IP): Performed by: PHYSICIAN ASSISTANT

## 2023-09-08 PROCEDURE — 96365 THER/PROPH/DIAG IV INF INIT: CPT

## 2023-09-08 PROCEDURE — 96376 TX/PRO/DX INJ SAME DRUG ADON: CPT

## 2023-09-08 PROCEDURE — 2580000003 HC RX 258: Performed by: PHYSICIAN ASSISTANT

## 2023-09-08 PROCEDURE — 85027 COMPLETE CBC AUTOMATED: CPT

## 2023-09-08 PROCEDURE — 83036 HEMOGLOBIN GLYCOSYLATED A1C: CPT

## 2023-09-08 PROCEDURE — 2500000003 HC RX 250 WO HCPCS: Performed by: NURSE PRACTITIONER

## 2023-09-08 PROCEDURE — 96366 THER/PROPH/DIAG IV INF ADDON: CPT

## 2023-09-08 PROCEDURE — 97163 PT EVAL HIGH COMPLEX 45 MIN: CPT

## 2023-09-08 PROCEDURE — G0378 HOSPITAL OBSERVATION PER HR: HCPCS

## 2023-09-08 PROCEDURE — 99205 OFFICE O/P NEW HI 60 MIN: CPT | Performed by: STUDENT IN AN ORGANIZED HEALTH CARE EDUCATION/TRAINING PROGRAM

## 2023-09-08 PROCEDURE — 36415 COLL VENOUS BLD VENIPUNCTURE: CPT

## 2023-09-08 PROCEDURE — 6360000002 HC RX W HCPCS: Performed by: NURSE PRACTITIONER

## 2023-09-08 RX ORDER — MORPHINE SULFATE 2 MG/ML
2 INJECTION, SOLUTION INTRAMUSCULAR; INTRAVENOUS ONCE
Status: COMPLETED | OUTPATIENT
Start: 2023-09-08 | End: 2023-09-08

## 2023-09-08 RX ORDER — LOSARTAN POTASSIUM 100 MG/1
100 TABLET ORAL DAILY
Status: DISCONTINUED | OUTPATIENT
Start: 2023-09-08 | End: 2023-09-09 | Stop reason: HOSPADM

## 2023-09-08 RX ORDER — ACETAMINOPHEN 500 MG
1000 TABLET ORAL ONCE
Status: COMPLETED | OUTPATIENT
Start: 2023-09-08 | End: 2023-09-08

## 2023-09-08 RX ORDER — ACETAMINOPHEN 500 MG
1000 TABLET ORAL EVERY 8 HOURS PRN
Status: DISCONTINUED | OUTPATIENT
Start: 2023-09-08 | End: 2023-09-09 | Stop reason: HOSPADM

## 2023-09-08 RX ADMIN — LOSARTAN POTASSIUM 100 MG: 100 TABLET, FILM COATED ORAL at 10:06

## 2023-09-08 RX ADMIN — ATORVASTATIN CALCIUM 40 MG: 40 TABLET, FILM COATED ORAL at 21:50

## 2023-09-08 RX ADMIN — APIXABAN 5 MG: 5 TABLET, FILM COATED ORAL at 21:50

## 2023-09-08 RX ADMIN — GABAPENTIN 600 MG: 300 CAPSULE ORAL at 21:49

## 2023-09-08 RX ADMIN — VALPROATE SODIUM 500 MG: 100 INJECTION, SOLUTION INTRAVENOUS at 21:59

## 2023-09-08 RX ADMIN — VALPROATE SODIUM 500 MG: 100 INJECTION, SOLUTION INTRAVENOUS at 13:39

## 2023-09-08 RX ADMIN — MORPHINE SULFATE 2 MG: 2 INJECTION, SOLUTION INTRAMUSCULAR; INTRAVENOUS at 06:28

## 2023-09-08 RX ADMIN — PANTOPRAZOLE SODIUM 40 MG: 40 TABLET, DELAYED RELEASE ORAL at 10:03

## 2023-09-08 RX ADMIN — ACETAMINOPHEN 1000 MG: 500 TABLET ORAL at 10:05

## 2023-09-08 RX ADMIN — APIXABAN 5 MG: 5 TABLET, FILM COATED ORAL at 10:03

## 2023-09-08 RX ADMIN — ACETAMINOPHEN 1000 MG: 500 TABLET ORAL at 18:32

## 2023-09-08 RX ADMIN — DILTIAZEM HYDROCHLORIDE 180 MG: 180 CAPSULE, COATED, EXTENDED RELEASE ORAL at 10:03

## 2023-09-08 RX ADMIN — SODIUM CHLORIDE, PRESERVATIVE FREE 10 ML: 5 INJECTION INTRAVENOUS at 10:03

## 2023-09-08 RX ADMIN — SODIUM CHLORIDE, PRESERVATIVE FREE 10 ML: 5 INJECTION INTRAVENOUS at 21:50

## 2023-09-08 ASSESSMENT — PAIN - FUNCTIONAL ASSESSMENT
PAIN_FUNCTIONAL_ASSESSMENT: PREVENTS OR INTERFERES SOME ACTIVE ACTIVITIES AND ADLS
PAIN_FUNCTIONAL_ASSESSMENT: ACTIVITIES ARE NOT PREVENTED
PAIN_FUNCTIONAL_ASSESSMENT: ACTIVITIES ARE NOT PREVENTED

## 2023-09-08 ASSESSMENT — PAIN SCALES - GENERAL
PAINLEVEL_OUTOF10: 5
PAINLEVEL_OUTOF10: 9
PAINLEVEL_OUTOF10: 10
PAINLEVEL_OUTOF10: 10

## 2023-09-08 ASSESSMENT — PAIN DESCRIPTION - LOCATION
LOCATION: HEAD

## 2023-09-08 ASSESSMENT — PAIN DESCRIPTION - DESCRIPTORS
DESCRIPTORS: ACHING
DESCRIPTORS: ACHING
DESCRIPTORS: OTHER (COMMENT)

## 2023-09-08 ASSESSMENT — PAIN DESCRIPTION - PAIN TYPE: TYPE: ACUTE PAIN

## 2023-09-08 ASSESSMENT — PAIN DESCRIPTION - ORIENTATION
ORIENTATION: ANTERIOR;UPPER
ORIENTATION: MID
ORIENTATION: ANTERIOR;UPPER

## 2023-09-08 NOTE — CONSULTS
Neurology Service Consult Note  [address] [place] [address]   Patient Name: Hudson Day  : 1955        Subjective:   Reason for consult: Stroke like symptms  77 y.o. female with history of DM, fibromyalgia, HLD, HTN, migraine, sleep apnea compliant with CPAP, hypothyroid, stroke, presenting to [address] [place] [address] with complaints of left sided facial numbness that started 30 minutes prior to arrival, headache and persistent worsening left sided weakness for a week. Patient has has strokes in the past with residual left sided weakness. Patient is currently on Eliquis and statin for secondary stroke prevention and management of carotid stenosis without any missed doses. Stroke alert was called and patient was evaluated by [facility] tele neurology service, initial NIHSS 4. She was not a candidate for intervention as she was out of the window, additionally she is on anticoagulant. Chart was reviewed in detail, patient was seen and assessed. She tells me that she had an episode of weakness in her left foot which she felt like she was dragging. Patient does report baseline weakness in the left from prior stroke and does not feel that her leg weakness was worse but more so the foot. Yesterday she developed left-sided facial numbness that was accompanied by a headache at the top of the head. She describes throbbing pain that was radiating down into the neck. Headache persist today and is rated at 8/10 and described as a throbbing pain. She denies any associated photophobia, phonophobia or nausea and vomiting. While transferring to the bathroom last evening patient tells me that she felt a \"flush of weakness\" that went down her body when getting up. She was more off balance and nurses transferred her via wheelchair. She generally uses a walker or cane at baseline. Today she reports left eye blurriness and continued weakness in the left foot.   Patient does have a history of cervical

## 2023-09-08 NOTE — PROGRESS NOTES
[place]  [facility]      Name:  Chantal Knight /Age/Sex: 1955  (77 y.o. female)   MRN & CSN:  [id number] & [id number] Encounter Date/Time: 2023 12:35 PM EDT    Location:  OBS  PCP: Jonh Villar [address] [place] [address] Day: 2    Assessment and Plan:   Chantal Knight is a 77 y.o. female with a past medical history of CVA, COPD, diabetes, HLD, hypertension who presents with strokelike symptoms. Stroke-like symptoms   -Has residual left-sided weakness from previous CVA  - LKW ~1100 23  -Presented with ~1 week of intermittent confusion, increased left-sided weakness, then developed left facial paresthesias while at work this a.m.  - CT head no acute process, old infarction in the left cerebellar hemisphere  - CTA head and neck no acute process  - MRI brain no acute process  -Echo with EF 55%, G1 DD, negative bubble study  -Still having left facial paresthesias and increased left-sided weakness this a.m. - suspect secondary to recrudescence in setting of hypertensive urgency versus atypical migraine  - continue Eliquis per neuro recs  -PT/OT  -Neurology consulted, appreciate recs    Headache  -Possible atypical migraine  -Ordered high-dose Tylenol, neurology started Depacon     Hypertension, uncontrolled  - continue cardizem, increase losartan   - PRN hydralazine for SBP >180     HLD  -Continue statin     History of CVA  -With residual left-sided weakness  -Continue Eliquis, statin    Aortic stenosis   - mild per echo   - follow-up with Dr. Juan Miguel Parker as OP    This patient was discussed with Dr. Meenakshi An. She was agreeable with the assessment and plan as dictated above. Current Living situation: home  Expected Disposition: same  Estimated D/C: 1-2 days    Diet ADULT DIET;  Regular; 5 carb choices (75 gm/meal)   DVT Prophylaxis [] Lovenox, []  Heparin, [] SCDs, [] Ambulation,  [x] Eliquis, [] Xarelto still having Coumadin   Code Status Full Code   Disposition Patient requires

## 2023-09-08 NOTE — PROGRESS NOTES
Physical Therapy  Facility/Department: [facility]ERV[facility]  Physical Therapy Initial Assessment    Name: Shivani Rios  : 1955  MRN: [id number]  Date of Service: 2023    Discharge Recommendations:  IP Rehab          Patient Diagnosis(es): The primary encounter diagnosis was Facial numbness. Diagnoses of Hypertensive urgency and H/O: stroke were also pertinent to this visit. Past Medical History:  has a past medical history of Arthritis, Cervical herniated disc, COPD (chronic obstructive pulmonary disease) ([address]), Diabetes mellitus ([address]), Fibromyalgia, Heart murmur, Hyperlipidemia, Hypertension, Migraines, PONV (postoperative nausea and vomiting), Sleep apnea, Thyroid disease, and Unspecified cerebral artery occlusion with cerebral infarction. Past Surgical History:  has a past surgical history that includes back surgery (rods and fusions in the back); Cholecystectomy, laparoscopic (2014); Appendectomy; Colonoscopy; Hysterectomy; Tonsillectomy; joint replacement; eye surgery (Bilateral); Colonoscopy (N/A, 2023); and Spinal cord stimulator removal (). Assessment   Body Structures, Functions, Activity Limitations Requiring Skilled Therapeutic Intervention: Decreased functional mobility ; Decreased safe awareness;Decreased high-level IADLs;Decreased ADL status; Decreased endurance;Decreased balance; Increased pain;Decreased strength  Therapy Prognosis: Good  Decision Making: High Complexity  Clinical Presentation: unpredictable characteristics  Requires PT Follow-Up: Yes  Activity Tolerance  Activity Tolerance: Patient tolerated evaluation without incident     Plan   Physcial Therapy Plan  General Plan: 3-5 times per week  Current Treatment Recommendations: Strengthening, ROM, Balance training, Functional mobility training, Transfer training, ADL/Self-care training, IADL training, Endurance training, Equipment evaluation, education, & procurement, Pain management, Gait training, Stair training, Neuromuscular re-education, Safety education & training, Patient/Caregiver education & training, Therapeutic activities, Home exercise program, Positioning, Cognitive/Perceptual training  Safety Devices  Type of Devices:  All fall risk precautions in place, Patient at risk for falls, Call light within reach, Bed alarm in place, Left in bed, Gait belt, Nurse notified     Restrictions  Restrictions/Precautions  Restrictions/Precautions: General Precautions, Fall Risk     Subjective   General  Chart Reviewed: Yes  Patient assessed for rehabilitation services?: Yes  Family / Caregiver Present: No  Follows Commands: Within Functional Limits  Subjective  Subjective: pain: low back 5/10; chronic         Social/Functional History  Social/Functional History  Lives With: Alone  Type of Home: Apartment  Home Layout: One level  Home Access: Level entry, Stairs to enter with rails  Entrance Stairs - Number of Steps: 1  Bathroom Shower/Tub: Tub/Shower unit  Bathroom Toilet: Handicap height  Bathroom Equipment: Shower chair, Hand-held shower  Home Equipment: Akiko Lazier, rolling  ADL Assistance: Independent  Homemaking Assistance: Independent  Ambulation Assistance: Independent  Transfer Assistance: Independent  Active : Yes  Occupation: Full time employment  Type of Occupation: accounting  Vision/Hearing  Vision  Vision: Within Functional Limits  Hearing  Hearing: Within functional limits    Cognition   Orientation  Overall Orientation Status: Within Functional Limits  Cognition  Overall Cognitive Status: WFL     Objective   Pulse: 78  Heart Rate Source: Monitor  BP: (!) 152/71  BP Location: Right upper arm  BP Method: Automatic  Patient Position: Semi fowlers  MAP (Calculated): 98  Respirations: 19  SpO2: 95 %  O2 Device: None (Room air)        Gross Assessment  Sensation: Intact (BLEs)        Strength RLE  Comment: knee extension: 4+/5, ankle DF: 4+/5  Strength LLE  Comment: knee extension: 4+/5, ankle DF:

## 2023-09-09 VITALS
RESPIRATION RATE: 18 BRPM | OXYGEN SATURATION: 98 % | DIASTOLIC BLOOD PRESSURE: 65 MMHG | WEIGHT: 221 LBS | HEIGHT: 62 IN | TEMPERATURE: 97.5 F | SYSTOLIC BLOOD PRESSURE: 160 MMHG | BODY MASS INDEX: 40.67 KG/M2 | HEART RATE: 73 BPM

## 2023-09-09 PROCEDURE — G0378 HOSPITAL OBSERVATION PER HR: HCPCS

## 2023-09-09 PROCEDURE — 2500000003 HC RX 250 WO HCPCS: Performed by: NURSE PRACTITIONER

## 2023-09-09 PROCEDURE — 2580000003 HC RX 258: Performed by: NURSE PRACTITIONER

## 2023-09-09 PROCEDURE — 6370000000 HC RX 637 (ALT 250 FOR IP): Performed by: PHYSICIAN ASSISTANT

## 2023-09-09 PROCEDURE — 96376 TX/PRO/DX INJ SAME DRUG ADON: CPT

## 2023-09-09 PROCEDURE — 2580000003 HC RX 258: Performed by: PHYSICIAN ASSISTANT

## 2023-09-09 PROCEDURE — 97166 OT EVAL MOD COMPLEX 45 MIN: CPT

## 2023-09-09 RX ORDER — LOSARTAN POTASSIUM 50 MG/1
100 TABLET ORAL DAILY
Qty: 60 TABLET | Refills: 0 | Status: SHIPPED | OUTPATIENT
Start: 2023-09-09

## 2023-09-09 RX ORDER — BUTALBITAL, ACETAMINOPHEN AND CAFFEINE 50; 325; 40 MG/1; MG/1; MG/1
1 TABLET ORAL EVERY 6 HOURS PRN
Qty: 15 TABLET | Refills: 0 | Status: SHIPPED | OUTPATIENT
Start: 2023-09-09

## 2023-09-09 RX ORDER — GABAPENTIN 300 MG/1
600 CAPSULE ORAL NIGHTLY
Qty: 60 CAPSULE | Refills: 0
Start: 2023-09-09 | End: 2023-10-09

## 2023-09-09 RX ORDER — DILTIAZEM HYDROCHLORIDE 240 MG/1
240 CAPSULE, EXTENDED RELEASE ORAL DAILY
Qty: 30 CAPSULE | Refills: 0 | Status: SHIPPED | OUTPATIENT
Start: 2023-09-09

## 2023-09-09 RX ORDER — DILTIAZEM HYDROCHLORIDE 240 MG/1
240 CAPSULE, COATED, EXTENDED RELEASE ORAL DAILY
Status: DISCONTINUED | OUTPATIENT
Start: 2023-09-09 | End: 2023-09-09 | Stop reason: HOSPADM

## 2023-09-09 RX ORDER — BUTALBITAL, ACETAMINOPHEN AND CAFFEINE 50; 325; 40 MG/1; MG/1; MG/1
1 TABLET ORAL ONCE
Status: COMPLETED | OUTPATIENT
Start: 2023-09-09 | End: 2023-09-09

## 2023-09-09 RX ADMIN — VALPROATE SODIUM 500 MG: 100 INJECTION, SOLUTION INTRAVENOUS at 05:47

## 2023-09-09 RX ADMIN — DILTIAZEM HYDROCHLORIDE 240 MG: 240 CAPSULE, EXTENDED RELEASE ORAL at 09:03

## 2023-09-09 RX ADMIN — ACETAMINOPHEN 1000 MG: 500 TABLET ORAL at 13:59

## 2023-09-09 RX ADMIN — APIXABAN 5 MG: 5 TABLET, FILM COATED ORAL at 09:03

## 2023-09-09 RX ADMIN — PANTOPRAZOLE SODIUM 40 MG: 40 TABLET, DELAYED RELEASE ORAL at 09:03

## 2023-09-09 RX ADMIN — BUTALBITAL, ACETAMINOPHEN AND CAFFEINE 1 TABLET: 325; 50; 40 TABLET ORAL at 09:03

## 2023-09-09 RX ADMIN — SODIUM CHLORIDE, PRESERVATIVE FREE 10 ML: 5 INJECTION INTRAVENOUS at 09:03

## 2023-09-09 RX ADMIN — LOSARTAN POTASSIUM 100 MG: 100 TABLET, FILM COATED ORAL at 09:03

## 2023-09-09 ASSESSMENT — PAIN DESCRIPTION - LOCATION
LOCATION: HEAD

## 2023-09-09 ASSESSMENT — PAIN SCALES - GENERAL
PAINLEVEL_OUTOF10: 5
PAINLEVEL_OUTOF10: 6

## 2023-09-09 NOTE — PROGRESS NOTES
Discharge instructions reviewed with patient. Pt verbalized understanding. IV removed with catheter intact. Pt has all belongings and is awaiting ride home.

## 2023-09-09 NOTE — DISCHARGE SUMMARY
V2.0  Discharge Summary    Name:  Jane Urena /Age/Sex: 1955 (77 y.o. female)   Admit Date: 2023  Discharge Date: 23    MRN & CSN:  [id number] & [id number] Encounter Date and Time 23 11:21 AM EDT    Attending:  Briana Hosknis MD Discharging Provider: Lynn Bailon, [facility] Course:     Brief HPI: Jane Urena is a 77 y.o. female with a past medical history of CVA, COPD, diabetes, HLD, hypertension who presents with strokelike symptoms. Problems addressed during this hospitalization:     Stroke-like symptoms   -Has residual left-sided weakness from previous CVA  - LKW ~1100 23  -Presented with ~1 week of intermittent confusion, increased left-sided weakness, then developed left facial paresthesias while at work this a.m.  - CT head no acute process, old infarction in the left cerebellar hemisphere  - CTA head and neck no acute process  - MRI brain no acute process  -Echo with EF 55%, G1 DD, negative bubble study  - suspect secondary to  hypertensive urgency versus atypical migraine. Symptoms improved on discharge. - neurology consulted -lower extremity symptoms possibly secondary to lumbar stenosis however patient denies back pain. Recommended to continue Eliquis, statin. Okay to discharge without further imaging with plans for outpatient follow-up. -PT recommended IPR however patient declined and wishes to return home with outpatient PT. Patient is ambulating at her baseline on discharge. Referral sent for outpatient PT/OT. Headache - improved  -Possible atypical migraine  -Symptoms improved after Depacon IV and Fioricet  -Prescribed short course of Fioricet as needed  -Recommend follow-up with PCP and neurology as outpatient    Hypertension, uncontrolled  - reports high trends at home  -Increased home losartan and Cardizem with some improvement  -Follow-up with cardiology as scheduled next week.   - encouraged to keep BP log     HLD  -Continue statin History of CVA  -With residual left-sided weakness  -Continue Eliquis, statin     Aortic stenosis   - mild per echo   - follow-up with Dr. Juan Miguel Parker as OP    This patient was discussed in conjunction with Dr. Meenakshi An. She was agreeable with patient's plan at discharge as dictated above. The patient expressed appropriate understanding of, and agreement with the discharge recommendations, medications, and plan. Consults this admission:  IP CONSULT TO NEUROLOGY    Discharge Diagnosis:   Left leg weakness    Discharge Instruction:   Follow up appointments: PCP, cardiology, neurology  Primary care physician: Jonh Villar MD within 2 weeks  Diet: regular diet   Activity: activity as tolerated  Disposition: Discharged to:   [x]Home, []C, []SNF, []Acute Rehab, []Hospice   Condition on discharge: Stable  Labs and Tests to be Followed up as an outpatient by PCP or Specialist:     Discharge Medications:        Medication List        ASK your doctor about these medications      albuterol sulfate  (90 Base) MCG/ACT inhaler  Commonly known as: PROVENTIL;VENTOLIN;PROAIR     apixaban 5 MG Tabs tablet  Commonly known as: ELIQUIS     atorvastatin 40 MG tablet  Commonly known as: LIPITOR     calcium carbonate-vitamin D 600-200 MG-UNIT Tabs     dilTIAZem 180 MG extended release capsule  Commonly known as: DILACOR XR     fexofenadine 180 MG tablet  Commonly known as: ALLEGRA     fluticasone furoate-vilanterol 100-25 MCG/ACT inhaler  Commonly known as: BREO ELLIPTA     gabapentin 300 MG capsule  Commonly known as: NEURONTIN  Take 2 capsules by mouth 2 times daily     losartan 50 MG tablet  Commonly known as: COZAAR  Ask about: Which instructions should I use?      pantoprazole 40 MG tablet  Commonly known as: PROTONIX     therapeutic multivitamin-minerals tablet     TYLENOL ARTHRITIS PAIN PO     vitamin C 250 MG tablet     Vitamin D3 50 MCG (2000 UT) Tabs             Objective Findings at Discharge:   BP (!) 176/68   Pulse 73

## 2023-09-09 NOTE — PROGRESS NOTES
Occupational Therapy  [address] OCCUPATIONAL THERAPY EVALUATION    History  [place]:  The primary encounter diagnosis was Facial numbness. Diagnoses of Hypertensive urgency and H/O: stroke were also pertinent to this visit. Restrictions:  Restrictions/Precautions  Restrictions/Precautions: General Precautions, Fall Risk                        Communication with other providers: PT    Subjective:  Patient states:  \"I am going home today and back to work obopay"  Pain:  none  Patient goal:  home    Occupational profile (relevant social history and personal factors):    Social/Functional History  Lives With: Alone  Type of Home: Apartment  Home Layout: One level  Home Access: Level entry, Stairs to enter with rails  Entrance Stairs - Number of Steps: 1  Bathroom Shower/Tub: Tub/Shower unit  Bathroom Toilet: Handicap height  Bathroom Equipment: Shower chair, Hand-held shower  Home Equipment: Lydia Chloe, rolling  ADL Assistance: Independent  Homemaking Assistance: Independent  Ambulation Assistance: Independent  Transfer Assistance: Independent  Active : Yes  Occupation: Full time employment  Type of Occupation: accounting    Examination of body systems (includes body structures/functions, activity/participation limitations):  Orientation: WFL   Cognition:  WFL   Observation:  Received pt in sorensen bathroom. Walking without device and nurse supervising. [facility]   Hearing:  WFL   ROM:  WFL BUE  Strength: 5/5 RUE, 4/5 LUE (residual baseline weakness from hx of strokes)  Sensation: not tested  [place]: WFL BUE  GMC: reduced LUE tested c  finger to nose (slowed, mildly reduced accuracy), not overt in function    ADLs  Feeding: MARK    Grooming: MARK    Dressing: UB MARK in seated LB MARK    Bathing: UB SBA in seated LB SBA    Toileting: Supervision    *Some ADL determined per observation of actual ADL performance, functional mobility, balance, activity tolerance, and cognition.      AM-PAC 6 click short form for

## 2023-09-09 NOTE — DISCHARGE INSTRUCTIONS
Check your blood pressure 1-2 times daily and keep a log to take your cardiology appointment this week so that Dr. Katie Burnett can see how your blood pressure has been running with the medication changes.

## 2023-09-09 NOTE — PLAN OF CARE
Problem: Discharge Planning  Goal: Discharge to home or other facility with appropriate resources  9/9/2023 0255 by Jennifer Guerrero RN  Outcome: Progressing  9/8/2023 1454 by Kristi Ybarra RN  Outcome: Progressing     Problem: Pain  Goal: Verbalizes/displays adequate comfort level or baseline comfort level  9/9/2023 0255 by Jennifer Guerrero RN  Outcome: Progressing  9/8/2023 1454 by Kristi Ybarra RN  Outcome: Progressing     Problem: Safety - Adult  Goal: Free from fall injury  Outcome: Progressing

## 2023-10-03 ENCOUNTER — TELEPHONE (OUTPATIENT)
Dept: NEUROLOGY | Age: 68
End: 2023-10-03

## 2023-10-03 NOTE — TELEPHONE ENCOUNTER
Pt called to confirm the appointment she scheduled in [facility]. Pt was informed per Dr. Skip Jansen recommendations from her hospital visit note. Pt stated she is \"coming in anyway, I have the right to be seen\".

## 2023-10-06 ENCOUNTER — OFFICE VISIT (OUTPATIENT)
Dept: NEUROLOGY | Age: 68
End: 2023-10-06

## 2023-10-06 VITALS
WEIGHT: 225 LBS | DIASTOLIC BLOOD PRESSURE: 78 MMHG | HEART RATE: 78 BPM | HEIGHT: 62 IN | SYSTOLIC BLOOD PRESSURE: 140 MMHG | OXYGEN SATURATION: 98 % | BODY MASS INDEX: 41.41 KG/M2

## 2023-10-06 DIAGNOSIS — Z86.73 HISTORY OF CVA (CEREBROVASCULAR ACCIDENT): Primary | ICD-10-CM

## 2023-10-06 DIAGNOSIS — G47.30 SLEEP APNEA, UNSPECIFIED TYPE: ICD-10-CM

## 2023-10-06 DIAGNOSIS — G43.109 MIGRAINE WITH AURA AND WITHOUT STATUS MIGRAINOSUS, NOT INTRACTABLE: ICD-10-CM

## 2023-10-06 RX ORDER — PROCHLORPERAZINE MALEATE 10 MG
10 TABLET ORAL 3 TIMES DAILY
Qty: 9 TABLET | Refills: 0 | Status: SHIPPED | OUTPATIENT
Start: 2023-10-06

## 2023-10-06 RX ORDER — RIMEGEPANT SULFATE 75 MG/75MG
TABLET, ORALLY DISINTEGRATING ORAL
Qty: 2 TABLET | Refills: 0 | Status: SHIPPED | COMMUNITY
Start: 2023-10-06

## 2023-10-06 RX ORDER — LOSARTAN POTASSIUM AND HYDROCHLOROTHIAZIDE 12.5; 1 MG/1; MG/1
1 TABLET ORAL DAILY
COMMUNITY
Start: 2023-10-04

## 2023-10-06 RX ORDER — HYDRALAZINE HYDROCHLORIDE 25 MG/1
25 TABLET, FILM COATED ORAL 2 TIMES DAILY WITH MEALS
COMMUNITY
Start: 2023-10-04

## 2023-10-06 RX ORDER — UBROGEPANT 100 MG/1
TABLET ORAL
Qty: 2 TABLET | Refills: 0 | Status: SHIPPED | COMMUNITY
Start: 2023-10-06

## 2023-10-06 NOTE — PROGRESS NOTES
10/6/23    Nitin Mueller  1955    Chief Complaint   Patient presents with    Follow[place]Up [place] [facility]     9/7/23       History of Present Illness  Saritha Jj is a 77 y.o. female presenting today for hospital follow-up when 9/8/2023 evaluated by Dr. Mally Padilla for Worsening left sided weakness, new left foot weakness, facial weakness/numbness likely secondary to recrudescence with HTN urgency. Cannot exclude migraine with atypical aura as cause of facial weakness and numbness given history of migraine and onset of headache at time of symptoms. No evidence for acute stroke on imaging studies. She continues on Eliquis, statin 40 mg for secondary stroke prevention. LDL 73, A1c 7.1. She has a history of DM, fibromyalgia, HLD, HTN, migraine, sleep apnea compliant with CPAP, hypothyroid, stroke. Her symptoms were thought possibly to be related to lumbar radiculopathy, no upper motor neuron findings on exam by Dr. Mally Padilla in the hospital.  PT was recommended. Today, she tells me after our visit on 9/7/2023, she developed a migraine. She does have a history of migraines with these are not her typical symptoms. Fortunately, she did get an MRI of the brain that was nonacute while she is at the hospital.  She tells me she still has a migraine associated with left sided blurriness in her vision, left facial numbness, left leg weakness, light/sound sensitivity. She did visit Dr. Mandy Arce and everything checked out okay. She is walking with a cane. She is participating physical therapy twice weekly. She follow-up with her cardiology recently.   Her blood pressure medication was changed, it was elevated in the hospital.    Migraine preventative medications tried: Gabapentin  Abortive therapy: Fioricet    Current Outpatient Medications   Medication Sig Dispense Refill    losartan-hydroCHLOROthiazide (HYZAAR) 100-12.5 MG per tablet Take 1 tablet by mouth daily      hydrALAZINE (APRESOLINE) 25 MG tablet

## 2023-10-09 ENCOUNTER — HOSPITAL ENCOUNTER (OUTPATIENT)
Dept: MRI IMAGING | Age: 68
Discharge: HOME | End: 2023-10-09
Attending: INTERNAL MEDICINE
Payer: MEDICARE

## 2023-10-09 DIAGNOSIS — K21.9 GASTROESOPHAGEAL REFLUX DISEASE WITHOUT ESOPHAGITIS: ICD-10-CM

## 2023-10-09 DIAGNOSIS — K57.30 DIVERTICULOSIS LARGE INTESTINE W/O PERFORATION OR ABSCESS W/O BLEEDING: ICD-10-CM

## 2023-10-09 DIAGNOSIS — R12 HEARTBURN: ICD-10-CM

## 2023-10-09 DIAGNOSIS — R13.14 DYSPHAGIA, PHARYNGOESOPHAGEAL PHASE: ICD-10-CM

## 2023-10-09 PROCEDURE — 6360000004 HC RX CONTRAST MEDICATION: Performed by: INTERNAL MEDICINE

## 2023-10-09 PROCEDURE — 74183 MRI ABD W/O CNTR FLWD CNTR: CPT

## 2023-10-09 PROCEDURE — A9577 INJ MULTIHANCE: HCPCS | Performed by: INTERNAL MEDICINE

## 2023-10-09 RX ADMIN — GADOBENATE DIMEGLUMINE 20 ML: 529 INJECTION, SOLUTION INTRAVENOUS at 08:57

## 2023-10-19 DIAGNOSIS — G43.109 MIGRAINE WITH AURA AND WITHOUT STATUS MIGRAINOSUS, NOT INTRACTABLE: Primary | ICD-10-CM

## 2023-10-19 RX ORDER — UBROGEPANT 100 MG/1
100 TABLET ORAL PRN
Qty: 10 TABLET | Refills: 2 | Status: SHIPPED | OUTPATIENT
Start: 2023-10-19

## 2023-12-06 ENCOUNTER — OFFICE VISIT (OUTPATIENT)
Dept: NEUROLOGY | Age: 68
End: 2023-12-06
Payer: MEDICARE

## 2023-12-06 VITALS
HEIGHT: 62 IN | DIASTOLIC BLOOD PRESSURE: 62 MMHG | OXYGEN SATURATION: 98 % | HEART RATE: 70 BPM | WEIGHT: 224.4 LBS | BODY MASS INDEX: 41.3 KG/M2 | SYSTOLIC BLOOD PRESSURE: 126 MMHG

## 2023-12-06 DIAGNOSIS — G47.30 SLEEP APNEA, UNSPECIFIED TYPE: ICD-10-CM

## 2023-12-06 DIAGNOSIS — G43.109 MIGRAINE WITH AURA AND WITHOUT STATUS MIGRAINOSUS, NOT INTRACTABLE: ICD-10-CM

## 2023-12-06 DIAGNOSIS — Z86.73 HISTORY OF CVA (CEREBROVASCULAR ACCIDENT): Primary | ICD-10-CM

## 2023-12-06 DIAGNOSIS — Z86.79 HISTORY OF CEREBRAL ARTERY OCCLUSION: ICD-10-CM

## 2023-12-06 PROCEDURE — 3074F SYST BP LT 130 MM HG: CPT | Performed by: NURSE PRACTITIONER

## 2023-12-06 PROCEDURE — 1123F ACP DISCUSS/DSCN MKR DOCD: CPT | Performed by: NURSE PRACTITIONER

## 2023-12-06 PROCEDURE — 99213 OFFICE O/P EST LOW 20 MIN: CPT | Performed by: NURSE PRACTITIONER

## 2023-12-06 PROCEDURE — 3078F DIAST BP <80 MM HG: CPT | Performed by: NURSE PRACTITIONER

## 2023-12-06 RX ORDER — ACETAMINOPHEN 160 MG
2000 TABLET,DISINTEGRATING ORAL DAILY
COMMUNITY

## 2024-01-02 RX ORDER — HYDRALAZINE HYDROCHLORIDE 50 MG/1
75 TABLET, FILM COATED ORAL 2 TIMES DAILY
Qty: 140 TABLET | Refills: 5 | Status: SHIPPED | OUTPATIENT
Start: 2024-01-02

## 2024-01-03 ENCOUNTER — TELEPHONE (OUTPATIENT)
Dept: PULMONOLOGY | Age: 69
End: 2024-01-03

## 2024-01-10 ENCOUNTER — TELEPHONE (OUTPATIENT)
Dept: PULMONOLOGY | Age: 69
End: 2024-01-10

## 2024-01-18 ENCOUNTER — OFFICE VISIT (OUTPATIENT)
Dept: PULMONOLOGY | Age: 69
End: 2024-01-18
Payer: MEDICARE

## 2024-01-18 VITALS
DIASTOLIC BLOOD PRESSURE: 72 MMHG | OXYGEN SATURATION: 98 % | SYSTOLIC BLOOD PRESSURE: 130 MMHG | RESPIRATION RATE: 26 BRPM | BODY MASS INDEX: 42.55 KG/M2 | HEART RATE: 86 BPM | WEIGHT: 231.2 LBS | HEIGHT: 62 IN

## 2024-01-18 DIAGNOSIS — G47.33 OSA (OBSTRUCTIVE SLEEP APNEA): Primary | ICD-10-CM

## 2024-01-18 DIAGNOSIS — G47.10 HYPERSOMNIA: ICD-10-CM

## 2024-01-18 DIAGNOSIS — I27.20 PULMONARY HYPERTENSION (HCC): ICD-10-CM

## 2024-01-18 DIAGNOSIS — R06.02 SOBOE (SHORTNESS OF BREATH ON EXERTION): ICD-10-CM

## 2024-01-18 DIAGNOSIS — E66.01 MORBID OBESITY WITH BMI OF 40.0-44.9, ADULT (HCC): ICD-10-CM

## 2024-01-18 PROCEDURE — 3078F DIAST BP <80 MM HG: CPT | Performed by: INTERNAL MEDICINE

## 2024-01-18 PROCEDURE — 1123F ACP DISCUSS/DSCN MKR DOCD: CPT | Performed by: INTERNAL MEDICINE

## 2024-01-18 PROCEDURE — 3075F SYST BP GE 130 - 139MM HG: CPT | Performed by: INTERNAL MEDICINE

## 2024-01-18 PROCEDURE — 99204 OFFICE O/P NEW MOD 45 MIN: CPT | Performed by: INTERNAL MEDICINE

## 2024-01-18 ASSESSMENT — ENCOUNTER SYMPTOMS
ABDOMINAL PAIN: 0
COUGH: 0
SHORTNESS OF BREATH: 1
EYE DISCHARGE: 0
ABDOMINAL DISTENTION: 0
EYE ITCHING: 0
BACK PAIN: 0

## 2024-01-18 NOTE — ASSESSMENT & PLAN NOTE
Appears to be sec to the JANENE and Diastolic dysfunction  Need ECHO  Right heart cath  Get yearly flu vaccine

## 2024-01-18 NOTE — PROGRESS NOTES
Claudia Macias  1955  Referring Provider: Ney Smart, [facility] Provider (PCP)     Subjective:     Chief Complaint   Patient presents with    New Patient     Referral from Dr. Smart, COPD dx last April from PFT, SOB is getting worse.     COPD    Sleep Apnea     Pt diagnosed with JANENE in 2021. Currently using [employer] for her cpap supplies. Wears full face mask, wears 6-8hrs/night.        HPI  Claudia is a 68 y.o. female has been referred for the COPD. She has h/o smoking socially for 1 year when she was young adult. She has second hand smoking exposure. She had a PFT done on 04/17/23 showed that she has restrictive lung disease sec to Obesity. She had a ECHO done on 09/07/23 showed: EF 55-60%, Grade I Diastolic dysfunction and normal RVSP. She has LE swelling. She is on Eliquis for CVA.    She has occasional cough, phlegm-clear to yellow, no fever, no chest pain, palpitations off and on,no loss of weight, good appetite, SOBOE walking less than 100 feet. She is not on oxygen. She is on Albuterol prn, Breo.She had her flu vaccine.    She has JANENE about 3 years ago. She is on the CPAP every night about 6 to 8 hours. She has a FFM. She is not sleepy or tired during the day time. Her last CXR done on 09/07/23 which showed:    Low lung volumes likely on the basis of patient inspiratory effort.     Lungs are clear.  Cardiac and mediastinal silhouettes are stable.  No  pneumothoraces.  Bony structures appear intact.  Elevated right hemidiaphragm.        Current Outpatient Medications   Medication Sig Dispense Refill    cloNIDine (CATAPRES) 0.2 MG tablet Take 1 tablet by mouth in the morning, at noon, and at bedtime 270 tablet 1    doxazosin (CARDURA) 2 MG tablet Take 1 tablet by mouth nightly 30 tablet 3    vitamin C (ASCORBIC ACID) 500 MG tablet Take 2 tablets by mouth three times a week MWF 30 tablet 1    hydrALAZINE (APRESOLINE) 50 MG tablet Take 1.5 tablets by mouth in the morning and at bedtime 140 tablet 5

## 2024-01-18 NOTE — ASSESSMENT & PLAN NOTE
At this time it appears to be sec to Obesity, Deconditioning, Diastolic dysfunction and suspected Pulmonary HTN  Diuresis  ECHO  Right Heart cath  Albuterol prn  Stop Breo  C/w CPAP  Need download data  Home o2 eval

## 2024-02-08 LAB — GLUCOSE BLD-MCNC: 130 MG/DL (ref 74–106)

## 2024-02-12 ENCOUNTER — OFFICE VISIT (OUTPATIENT)
Dept: PULMONOLOGY | Age: 69
End: 2024-02-12
Payer: MEDICARE

## 2024-02-12 VITALS
DIASTOLIC BLOOD PRESSURE: 64 MMHG | BODY MASS INDEX: 41.33 KG/M2 | OXYGEN SATURATION: 98 % | WEIGHT: 224.6 LBS | HEART RATE: 66 BPM | SYSTOLIC BLOOD PRESSURE: 102 MMHG | HEIGHT: 62 IN

## 2024-02-12 DIAGNOSIS — E66.01 MORBID OBESITY WITH BMI OF 40.0-44.9, ADULT (HCC): ICD-10-CM

## 2024-02-12 DIAGNOSIS — G47.33 OSA (OBSTRUCTIVE SLEEP APNEA): ICD-10-CM

## 2024-02-12 DIAGNOSIS — R06.02 SOBOE (SHORTNESS OF BREATH ON EXERTION): ICD-10-CM

## 2024-02-12 DIAGNOSIS — I27.20 PULMONARY HYPERTENSION (HCC): Primary | ICD-10-CM

## 2024-02-12 DIAGNOSIS — G47.10 HYPERSOMNIA: ICD-10-CM

## 2024-02-12 PROCEDURE — 99215 OFFICE O/P EST HI 40 MIN: CPT | Performed by: INTERNAL MEDICINE

## 2024-02-12 PROCEDURE — 3078F DIAST BP <80 MM HG: CPT | Performed by: INTERNAL MEDICINE

## 2024-02-12 PROCEDURE — 1123F ACP DISCUSS/DSCN MKR DOCD: CPT | Performed by: INTERNAL MEDICINE

## 2024-02-12 PROCEDURE — 3074F SYST BP LT 130 MM HG: CPT | Performed by: INTERNAL MEDICINE

## 2024-02-12 NOTE — PROGRESS NOTES
Claudia Macias  1955  Referring Provider[facility]ey Smart, [facility]     Subjective:     Chief Complaint   Patient presents with    Follow-up       HPI  Claudia is a 68 y.o. female has come back as a follow up. She has some SOBOE and weight gain since last visit. She is multiple blood pressure medications. She is being followed by Dr. Clay. She has little cough, little phlegm, SOBOE walking 50 fett. But her Home O2 eval recently was normal. She is on Albuterol pnr.  She has Grade I Diastolic dysfunction. Her last ECHO done on 09/07/23 showed Grade I Diastolic dysfunction and normal RVSP, EF 55-60%.    She had a CTA done on 02/08/24 which showed:    1.  No CT evidence for an acute pulmonary embolus.   2.  No CT evidence for acute pathology in the chest.     She had a Right Heart cath done on 02/08/24 which showed:    Mean PA pressure of 41 mhg and her PCWP is 24 mmhg.    Await PFT and a  6 MWT. She had her flu vaccine, RSV, Pneumovax and COVID.    She has JANENE on a CPAP about 3 years ago. She is using it every night about 6 to 8 hours. She has a FFM. She is not sleepy or tired during the day time.     Current Outpatient Medications   Medication Sig Dispense Refill    cloNIDine (CATAPRES) 0.2 MG tablet Take 1 tablet by mouth See Admin Instructions 0.1mg in morning, 0.1mg at lunch and 0.2mg at bedtime 270 tablet 1    doxazosin (CARDURA) 2 MG tablet Take 0.5 tablets by mouth nightly 30 tablet 1    vitamin C (ASCORBIC ACID) 500 MG tablet Take 2 tablets by mouth three times a week MWF 30 tablet 1    hydrALAZINE (APRESOLINE) 50 MG tablet Take 1.5 tablets by mouth in the morning and at bedtime 140 tablet 5    Cholecalciferol (VITAMIN D3) 50 MCG (2000 UT) CAPS Take 1 capsule by mouth daily      Azilsartan-Chlorthalidone 40-25 MG TABS Take 1 tablet by mouth daily 30 tablet 5    Ubrogepant (UBRELVY) 100 MG TABS Take 100 mg by mouth as needed (Migraine) May repeat after 2 hours if needed 10 tablet 2    gabapentin

## 2024-02-12 NOTE — ASSESSMENT & PLAN NOTE
Appears to be sec to Diastolic dysfunction and JANENE  Advised CHF optimization  BP control  Be compliant with CPAP  Need 2 week download data  Get yearly flu vaccine   PFT  6 MWT

## 2024-02-14 LAB
OXYHEMOGLOBIN, POC: 69 %
OXYHEMOGLOBIN, POC: 93 %

## 2024-04-05 ENCOUNTER — HOSPITAL ENCOUNTER (OUTPATIENT)
Dept: PULMONOLOGY | Age: 69
Discharge: HOME | End: 2024-04-05
Attending: INTERNAL MEDICINE
Payer: MEDICARE

## 2024-04-05 DIAGNOSIS — I27.20 PULMONARY HYPERTENSION (HCC): ICD-10-CM

## 2024-04-05 DIAGNOSIS — R06.02 SOBOE (SHORTNESS OF BREATH ON EXERTION): ICD-10-CM

## 2024-04-05 LAB
DISTANCE WALKED: 760 FT
DLCO %PRED: 86 %
DLCO PRED: NORMAL
DLCO/VA %PRED: 98 %
DLCO/VA PRED: NORMAL
DLCO/VA: NORMAL
DLCO: NORMAL
EXPIRATORY TIME-POST: NORMAL
EXPIRATORY TIME: NORMAL
FEF 25-75 %CHNG: NORMAL
FEF 25-75 POST %PRED: 187 %
FEF 25-75% %PRED-PRE: 161 L/SEC
FEF 25-75% PRED: NORMAL
FEF 25-75-POST: NORMAL
FEF 25-75-PRE: NORMAL
FEV1 %PRED-POST: 120 %
FEV1 %PRED-PRE: 117 %
FEV1 PRED: NORMAL
FEV1-POST: NORMAL
FEV1-PRE: NORMAL
FEV1/FVC %PRED-POST: 110 %
FEV1/FVC %PRED-PRE: 106 %
FEV1/FVC PRED: NORMAL
FEV1/FVC-POST: NORMAL
FEV1/FVC-PRE: NORMAL
FVC %PRED-POST: 108 L
FVC %PRED-PRE: 110 %
FVC PRED: NORMAL
FVC-POST: NORMAL
FVC-PRE: NORMAL
GAW %PRED: NORMAL
GAW PRED: NORMAL
GAW: NORMAL
IC PRE %PRED: 133 %
IC PRED: NORMAL
IC: NORMAL
MEP: NORMAL
MIP: NORMAL
MVV %PRED-PRE: NORMAL
MVV PRED: NORMAL
MVV-PRE: NORMAL
PEF %PRED-POST: NORMAL
PEF %PRED-PRE: NORMAL
PEF PRED: NORMAL
PEF%CHNG: NORMAL
PEF-POST: NORMAL
PEF-PRE: NORMAL
RAW %PRED: NORMAL
RAW PRED: NORMAL
RAW: NORMAL
RV PRE %PRED: 76 %
RV PRED: NORMAL
RV: NORMAL
SPO2: 96 %
SVC %PRED: 110 %
SVC PRED: NORMAL
SVC: NORMAL
TLC PRE %PRED: 89 %
TLC PRED: NORMAL
TLC: NORMAL
VA %PRED: 87 %
VA PRED: NORMAL
VA: NORMAL
VTG %PRED: NORMAL
VTG PRED: NORMAL
VTG: NORMAL

## 2024-04-05 PROCEDURE — 94729 DIFFUSING CAPACITY: CPT

## 2024-04-05 PROCEDURE — 94618 PULMONARY STRESS TESTING: CPT

## 2024-04-05 PROCEDURE — 94060 EVALUATION OF WHEEZING: CPT

## 2024-04-05 PROCEDURE — 94727 GAS DIL/WSHOT DETER LNG VOL: CPT

## 2024-04-05 ASSESSMENT — PULMONARY FUNCTION TESTS
FVC_PERCENT_PREDICTED_PRE: 110
FVC_PERCENT_PREDICTED_POST: 108
FEV1_PERCENT_PREDICTED_PRE: 117
FEV1_PERCENT_PREDICTED_POST: 120
FEV1/FVC_PERCENT_PREDICTED_PRE: 106
FEV1/FVC_PERCENT_PREDICTED_POST: 110

## 2024-04-05 NOTE — PROGRESS NOTES
[facility] Pulmonary Function Lab - Six Minute Walk  Test Performed on: Room Air_____ Oxygen at _____ lpm via N/C  Assist Device Used During Test:    None____ Cane____ Walker___   Shortness of Breath - Jose F's Scale  0 Nothing at all 5 Severe    0.5 Very very slight 6    1 Very slight 7 Very severe   2 Slight 8     3 Moderate 9 Very very severe   4 Somewhat severe 10 Maximal      Time SpO2 Heart Rate Respiratory Rate Modified Jose F’s Scale Other      Baseline       95     %     69    17 PRE    0            1 minute                     96     %     109       1           2 minutes               96     %      118      2           3 minutes                98        %      120         3           4 minutes              98        %      121          3           5 minutes          98        %      121          4           6 minutes             98        %      118         4        Recovery   x 1 Min                  96      %      95     28 POST     3        Recovery   x 2 Min                   99     %      70           1         Number of Laps__4_ X 170 feet _680__+ _80_ additional feet = Total _760__feet1  Stopped or paused before 6 minutes? No_X___ Yes ____   Pre Blood Pressure: 117 / 58_    Post Blood Pressure:_131_/_61__  Interpretation:                              
none

## 2024-04-12 NOTE — PROGRESS NOTES
Patient will be called with an arrival time on 4/17/2024 for her procedure at [facility] on 4/18/2024.    NOTHING TO EAT OR DRINK AFTER MIDNIGHT DAY OF SURGERY    1. Enter thru the hospital main entrance on day of surgery, check in at the Information Desk. If you arrive prior to 6:00am, enter thru the ER entrance.    2. Follow the directions as prescribed by the doctor for your procedure and medications.         Morning of surgery take:diltiazem and  protonix.          Stop vitamins, supplements and NSAIDS:      3. Check with your Doctor regarding stopping blood thinners and follow their instructions.eliquis last dose 4/15/2024.    4. Do not smoke, vape or use chewing tobacco morning of surgery. Do not drink any alcoholic beverages 24 hours prior to surgery.       This includes NA Beer. No street drugs 7 days prior to surgery.    5. If you have dentures, contacts of glasses they will be removed before going to the OR; please bring a case.    6. Please bring picture ID, insurance card, paperwork from the doctor’s office (H & P, Consent, & card for implantable devices).    7. Take a shower with an antibacterial soap the night before surgery and the morning of surgery. Do not put anything on your skin      After your morning shower.    8. You will need a responsible adult to drive you home and check on you after surgery.

## 2024-04-16 ENCOUNTER — OFFICE VISIT (OUTPATIENT)
Dept: PULMONOLOGY | Age: 69
End: 2024-04-16
Payer: MEDICARE

## 2024-04-16 VITALS
RESPIRATION RATE: 16 BRPM | DIASTOLIC BLOOD PRESSURE: 74 MMHG | HEART RATE: 89 BPM | BODY MASS INDEX: 39.96 KG/M2 | WEIGHT: 217.13 LBS | HEIGHT: 62 IN | OXYGEN SATURATION: 98 % | SYSTOLIC BLOOD PRESSURE: 128 MMHG

## 2024-04-16 DIAGNOSIS — G47.10 HYPERSOMNIA: ICD-10-CM

## 2024-04-16 DIAGNOSIS — E66.01 MORBID OBESITY WITH BMI OF 40.0-44.9, ADULT (HCC): ICD-10-CM

## 2024-04-16 DIAGNOSIS — I27.20 PULMONARY HYPERTENSION (HCC): ICD-10-CM

## 2024-04-16 DIAGNOSIS — G47.33 OSA (OBSTRUCTIVE SLEEP APNEA): Primary | ICD-10-CM

## 2024-04-16 PROCEDURE — 1123F ACP DISCUSS/DSCN MKR DOCD: CPT | Performed by: INTERNAL MEDICINE

## 2024-04-16 PROCEDURE — 3074F SYST BP LT 130 MM HG: CPT | Performed by: INTERNAL MEDICINE

## 2024-04-16 PROCEDURE — 99215 OFFICE O/P EST HI 40 MIN: CPT | Performed by: INTERNAL MEDICINE

## 2024-04-16 PROCEDURE — 3078F DIAST BP <80 MM HG: CPT | Performed by: INTERNAL MEDICINE

## 2024-04-16 ASSESSMENT — ENCOUNTER SYMPTOMS
EYE DISCHARGE: 0
ABDOMINAL DISTENTION: 0
BACK PAIN: 0
ABDOMINAL PAIN: 0
COUGH: 0
SHORTNESS OF BREATH: 1
EYE ITCHING: 0

## 2024-04-16 NOTE — PROGRESS NOTES
Former     Current packs/day: 0.00     Average packs/day: 0.3 packs/day for 2.0 years (0.5 ttl pk-yrs)     Types: Cigarettes     Start date:      Quit date:      Years since quittin.3    Smokeless tobacco: Never   Vaping Use    Vaping Use: Never used   Substance and Sexual Activity    Alcohol use: No    Drug use: No       Review of Systems   Constitutional:  Negative for fatigue.   HENT:  Negative for congestion and postnasal drip.    Eyes:  Negative for discharge and itching.   Respiratory:  Positive for shortness of breath. Negative for cough.    Cardiovascular:  Negative for chest pain and leg swelling.   Gastrointestinal:  Negative for abdominal distention and abdominal pain.   Endocrine: Negative for cold intolerance and heat intolerance.   Genitourinary:  Negative for enuresis and frequency.   Musculoskeletal:  Negative for arthralgias and back pain.   Allergic/Immunologic: Negative for environmental allergies and food allergies.   Neurological:  Negative for light-headedness and headaches.   Hematological:  Negative for adenopathy.   Psychiatric/Behavioral:  Negative for agitation and behavioral problems.        Objective:   /74   Pulse 89   Resp 16   Ht 1.575 m (5' 2\")   Wt 98.5 kg (217 lb 2 oz)   SpO2 98%   BMI 39.71 kg/m²   Body mass index is 39.71 kg/m².       No data to display              Mallampati 3    Physical Exam  Vitals reviewed.   Constitutional:       Appearance: Normal appearance.   HENT:      Head: Normocephalic and atraumatic.      Nose: Nose normal.      Mouth/Throat:      Mouth: Mucous membranes are moist.   Eyes:      Extraocular Movements: Extraocular movements intact.      Pupils: Pupils are equal, round, and reactive to light.   Cardiovascular:      Rate and Rhythm: Normal rate and regular rhythm.      Pulses: Normal pulses.      Heart sounds: Normal heart sounds.   Pulmonary:      Effort: Pulmonary effort is normal.      Breath sounds: Normal breath sounds.

## 2024-04-16 NOTE — ASSESSMENT & PLAN NOTE
Appears to be sec to Diastolic dysfunction and JANENE  Advised CHF optimization  Be compliant with CPAP  Get yearly flu vaccine

## 2024-04-17 ENCOUNTER — ANESTHESIA EVENT (OUTPATIENT)
Dept: ENDOSCOPY | Age: 69
End: 2024-04-17
Payer: MEDICARE

## 2024-04-17 NOTE — PROGRESS NOTES
Spoke with patient and she will arrive at 0730 at [facility] on 4/18/2024 for her procedure at 0900. Orders are in [facility].

## 2024-04-17 NOTE — ANESTHESIA PRE PROCEDURE
Department of Anesthesiology  Preprocedure Note       Name:  Claudia Macias   Age:  69 y.o.  :  1955                                          MRN:  [id number]         Date:  2024      Surgeon: Surgeon(s):  Lu Schneider MD    Procedure: Procedure(s):  ESOPHAGOGASTRODUODENOSCOPY    Medications prior to admission:   Prior to Admission medications    Medication Sig Start Date End Date Taking? Authorizing Provider   furosemide (LASIX) 40 MG tablet Take 1 tablet by mouth daily 24   Rivera Raymundo MD   potassium chloride (KLOR-CON M) 20 MEQ extended release tablet Take 1 tablet by mouth daily with food 24   Rivera Raymundo MD   vitamin C (ASCORBIC ACID) 500 MG tablet Take 2 tablets by mouth three times a week MWF 1/10/24   Maria Antonia Clay DO   Cholecalciferol (VITAMIN D3) 50 MCG ( UT) CAPS Take 1 capsule by mouth daily    Rivera Raymundo MD   Azilsartan-Chlorthalidone 40-25 MG TABS Take 1 tablet by mouth daily 23   Maria Antonia Clay DO   Ubrogepant (UBRELVY) 100 MG TABS Take 100 mg by mouth as needed (Migraine) May repeat after 2 hours if needed 10/19/23   Gaudencio Barnhart, APRN - CNP   gabapentin (NEURONTIN) 300 MG capsule Take 2 capsules by mouth nightly for 30 days. 23   Sena Antunez PA-C   dilTIAZem (DILACOR XR) 240 MG extended release capsule Take 1 capsule by mouth daily 23   Sena Antunez PA-C   albuterol sulfate HFA (PROVENTIL;VENTOLIN;PROAIR) 108 (90 Base) MCG/ACT inhaler Inhale 2 puffs into the lungs every 6 hours as needed for Wheezing    Rivera Raymundo MD   apixaban (ELIQUIS) 5 MG TABS tablet Take by mouth 2 times daily    Rivera Raymundo MD   Acetaminophen (TYLENOL ARTHRITIS PAIN PO) Take 2 tablets by mouth daily    Rivera Raymundo MD   atorvastatin (LIPITOR) 40 MG tablet Take 1 tablet by mouth nightly    Rivera Raymundo MD   pantoprazole (PROTONIX) 40 MG tablet Take 1 tablet by mouth daily    Zackary

## 2024-04-18 ENCOUNTER — HOSPITAL ENCOUNTER (OUTPATIENT)
Age: 69
Setting detail: OUTPATIENT SURGERY
Discharge: HOME | End: 2024-04-18
Attending: INTERNAL MEDICINE | Admitting: INTERNAL MEDICINE
Payer: MEDICARE

## 2024-04-18 ENCOUNTER — ANESTHESIA (OUTPATIENT)
Dept: ENDOSCOPY | Age: 69
End: 2024-04-18
Payer: MEDICARE

## 2024-04-18 VITALS
RESPIRATION RATE: 18 BRPM | SYSTOLIC BLOOD PRESSURE: 135 MMHG | TEMPERATURE: 97.1 F | BODY MASS INDEX: 39.38 KG/M2 | OXYGEN SATURATION: 96 % | DIASTOLIC BLOOD PRESSURE: 71 MMHG | HEART RATE: 68 BPM | WEIGHT: 214 LBS | HEIGHT: 62 IN

## 2024-04-18 DIAGNOSIS — R10.11 RUQ PAIN: ICD-10-CM

## 2024-04-18 DIAGNOSIS — K83.8 COMMON BILE DUCT DILATATION: ICD-10-CM

## 2024-04-18 PROCEDURE — 3609012400 HC EGD TRANSORAL BIOPSY SINGLE/MULTIPLE: Performed by: INTERNAL MEDICINE

## 2024-04-18 PROCEDURE — 3700000000 HC ANESTHESIA ATTENDED CARE: Performed by: INTERNAL MEDICINE

## 2024-04-18 PROCEDURE — 2500000003 HC RX 250 WO HCPCS: Performed by: NURSE ANESTHETIST, CERTIFIED REGISTERED

## 2024-04-18 PROCEDURE — 2709999900 HC NON-CHARGEABLE SUPPLY: Performed by: INTERNAL MEDICINE

## 2024-04-18 PROCEDURE — 6360000002 HC RX W HCPCS: Performed by: NURSE ANESTHETIST, CERTIFIED REGISTERED

## 2024-04-18 PROCEDURE — 88305 TISSUE EXAM BY PATHOLOGIST: CPT | Performed by: PATHOLOGY

## 2024-04-18 PROCEDURE — 88342 IMHCHEM/IMCYTCHM 1ST ANTB: CPT | Performed by: PATHOLOGY

## 2024-04-18 PROCEDURE — 7100000011 HC PHASE II RECOVERY - ADDTL 15 MIN: Performed by: INTERNAL MEDICINE

## 2024-04-18 PROCEDURE — 3700000001 HC ADD 15 MINUTES (ANESTHESIA): Performed by: INTERNAL MEDICINE

## 2024-04-18 PROCEDURE — 7100000010 HC PHASE II RECOVERY - FIRST 15 MIN: Performed by: INTERNAL MEDICINE

## 2024-04-18 PROCEDURE — 2580000003 HC RX 258: Performed by: ANESTHESIOLOGY

## 2024-04-18 RX ORDER — LIDOCAINE HYDROCHLORIDE 20 MG/ML
INJECTION, SOLUTION INFILTRATION; PERINEURAL PRN
Status: DISCONTINUED | OUTPATIENT
Start: 2024-04-18 | End: 2024-04-18 | Stop reason: SDUPTHER

## 2024-04-18 RX ORDER — SODIUM CHLORIDE, SODIUM LACTATE, POTASSIUM CHLORIDE, CALCIUM CHLORIDE 600; 310; 30; 20 MG/100ML; MG/100ML; MG/100ML; MG/100ML
INJECTION, SOLUTION INTRAVENOUS CONTINUOUS
Status: DISCONTINUED | OUTPATIENT
Start: 2024-04-18 | End: 2024-04-18 | Stop reason: HOSPADM

## 2024-04-18 RX ORDER — PROPOFOL 10 MG/ML
INJECTION, EMULSION INTRAVENOUS PRN
Status: DISCONTINUED | OUTPATIENT
Start: 2024-04-18 | End: 2024-04-18 | Stop reason: SDUPTHER

## 2024-04-18 RX ADMIN — PROPOFOL 200 MG: 10 INJECTION, EMULSION INTRAVENOUS at 09:20

## 2024-04-18 RX ADMIN — LIDOCAINE HYDROCHLORIDE 50 MG: 20 INJECTION, SOLUTION INFILTRATION; PERINEURAL at 09:20

## 2024-04-18 RX ADMIN — SODIUM CHLORIDE, POTASSIUM CHLORIDE, SODIUM LACTATE AND CALCIUM CHLORIDE: 600; 310; 30; 20 INJECTION, SOLUTION INTRAVENOUS at 07:55

## 2024-04-18 ASSESSMENT — PAIN SCALES - GENERAL: PAINLEVEL_OUTOF10: 0

## 2024-04-18 ASSESSMENT — PAIN - FUNCTIONAL ASSESSMENT
PAIN_FUNCTIONAL_ASSESSMENT: 0-10
PAIN_FUNCTIONAL_ASSESSMENT: 0-10

## 2024-04-18 NOTE — H&P
[facility]  Pre-operative History and Physical    Patient: Claudia Macias  : 1955  Acct#:       HISTORY OF PRESENT ILLNESS:    The patient is a 69 y.o. female with significant past medical history as below who presents for EGD     Indication RUQ pain    History Obtained From:  Patient and review of all records    Past Medical History:        Diagnosis Date    Arthritis     in    Cervical herniated disc     lumbar    CHF (congestive heart failure) (HCC)     COPD (chronic obstructive pulmonary disease) (HCC)     Diabetes mellitus (HCC)     controlled with diet    Fibromyalgia     Heart murmur     Hx of blood clots     Hyperlipidemia     Hypertension     Migraines     PONV (postoperative nausea and vomiting)     Sleep apnea     Thyroid disease     Unspecified cerebral artery occlusion with cerebral infarction     cva x 3       Past Surgical History:        Procedure Laterality Date    APPENDECTOMY      BACK SURGERY  rods and fusions in the back    CHOLECYSTECTOMY, LAPAROSCOPIC  2014    COLONOSCOPY      COLONOSCOPY N/A 2023    COLONOSCOPY DIAGNOSTIC performed by Sterling GREEN MD at [facility] END[facility]COPY    EYE SURGERY Bilateral     cataracts    HYSTERECTOMY (CERVIX STATUS UNKNOWN)      total abdominal    JOINT REPLACEMENT      left knee    SPINAL CORD STIMULATOR REMOVAL      FULLY EXPLANTED    TONSILLECTOMY           Current Medications:    Medications    Prior to Admission medications    Medication Sig Start Date End Date Taking? Authorizing Provider   furosemide (LASIX) 40 MG tablet Take 1 tablet by mouth daily 24   ProviderRivera MD   potassium chloride (KLOR-CON M) 20 MEQ extended release tablet Take 1 tablet by mouth daily with food 24   ProviderRivera MD   vitamin C (ASCORBIC ACID) 500 MG tablet Take 2 tablets by mouth three times a week MWF 1/10/24   Maria Antonia Clay DO   Cholecalciferol (VITAMIN D3) 50 MCG ( UT) CAPS Take 1 capsule by mouth daily     ASA 3 - Patient with moderate systemic disease with functional limitations  Air way:    Prior Anesthesia complications: Nill      ASSESSMENT AND PLAN:    1.  Patient is a 69 y.o. female here for EGD with deep sedation  2.  Procedure options, risks and benefits reviewed with patient.  Patient expresses understanding.      Lu Schneider MD  [facility]  4/18/2024  8:40 AM      Please note the time of note may not reflect time of encounter

## 2024-04-18 NOTE — ANESTHESIA POSTPROCEDURE EVALUATION
Department of Anesthesiology  Postprocedure Note    Patient: Claudia Macias  MRN: [id number]  YOB: 1955  Date of evaluation: 4/18/2024    Procedure Summary       Date: 04/18/24 Room / Location: [place] [address] / [facility]    Anesthesia Start: 0913 Anesthesia Stop: 0928    Procedure: ESOPHAGOGASTRODUODENOSCOPY BIOPSY FOR CELIAC DISEASE, H-PYLORI, AND EOSINOPHILIC ESOPHAGITIS Diagnosis:       RUQ pain      Common bile duct dilatation      (RUQ pain [R10.11])      (Common bile duct dilatation [K83.8])    Surgeons: Lu Schneider MD Responsible Provider: Gianfranco Pool MD    Anesthesia Type: MAC ASA Status: 3            Anesthesia Type: No value filed.    Kiki Phase I:      Kiki Phase II: Kiki Score: 10    Anesthesia Post Evaluation    Patient location during evaluation: bedside  Patient participation: complete - patient participated  Level of consciousness: awake    There were no known notable events for this encounter.

## 2024-04-18 NOTE — DISCHARGE INSTRUCTIONS
[facility]  [phone]    Do not drive, work around machines or use equipment.  Do not drink any alcoholic beverages.  Do not smoke while alone.  Avoid making important decisions.  Plan to spend a quiet, relaxed evening @ home.  Resume normal activities as you begin to feel better.  Eat lightly for your first meal, then gradually increase your diet to what is normal for you.  In case of nausea, avoid food and drink only clear liquids.  Resume food as nausea ceases.  Notify your surgeon if you experience fever, chills, large amount of bleeding, difficulty breathing, persistent nausea and vomiting or any other disturbing problem.  Call for a follow-up appointment with your surgeon.       EGD         FOLLOW UP APPOINTMENT IN 1-2 WEEKS OR AS NEEDED.    PATHOLOGY PENDING     What to Expect at Home  Your Recovery:  The only discomfort after your EGD is generally limited to a mild soreness of the throat, which may last a day or two. Call your physician immediately if you have severe chest pain, shortness of breath or a temperature of 100 degrees or higher if taken orally.    How can you care for yourself at home?  Activity  Rest as much as you need to after you go home.  You should be able to go back to your usual activities the day after the test.  Diet  Follow your doctor's directions for eating after the test.  Drink plenty of fluids (unless your doctor has told you not to).  Medications  If you have a sore throat the day after the test, use an over-the-counter spray to numb your throat.  Your doctor will tell you if and when you can restart your medicines. He or she will also give you instructions about taking any new medicines.  If you take blood thinners, such as warfarin (Coumadin), clopidogrel (Plavix), or aspirin, be sure to talk to your doctor. He or she will tell you if and when to start taking those medicines again. Make sure that you understand exactly what your doctor

## 2024-04-18 NOTE — ANESTHESIA POSTPROCEDURE EVALUATION
Department of Anesthesiology  Postprocedure Note    Patient: Claudia Macias  MRN: [id number]  YOB: 1955  Date of evaluation: 4/18/2024    Procedure Summary       Date: 04/18/24 Room / Location: [place] [address] / [facility]    Anesthesia Start: 0913 Anesthesia Stop: 0928    Procedure: ESOPHAGOGASTRODUODENOSCOPY BIOPSY FOR CELIAC DISEASE, H-PYLORI, AND EOSINOPHILIC ESOPHAGITIS Diagnosis:       RUQ pain      Common bile duct dilatation      (RUQ pain [R10.11])      (Common bile duct dilatation [K83.8])    Surgeons: Lu Schneider MD Responsible Provider: Gianfranco Pool MD    Anesthesia Type: MAC ASA Status: 3            Anesthesia Type: No value filed.    Kiki Phase I:      Kiki Phase II:      Anesthesia Post Evaluation    Patient location during evaluation: bedside  Patient participation: complete - patient participated  Level of consciousness: awake and alert  Pain score: 0  Airway patency: patent  Nausea & Vomiting: no nausea and no vomiting  Cardiovascular status: hemodynamically stable  Respiratory status: acceptable  Hydration status: euvolemic  Pain management: adequate and satisfactory to patient    No notable events documented.

## 2024-04-18 NOTE — PROGRESS NOTES
0944 Pt. Brought back to unit, bedside report received from STEPHANIE Anaya. Pt. Is A&O, VSS, pt. Provided with beverage and crackers, call light in reach. 0985 VSS, pt.denies pain or further needs at this time. 1005 DC instructions reviewed with pt and sister, all parties express understanding. 1015 Pt. To DC home in private vehicle.

## 2024-04-18 NOTE — ANESTHESIA POSTPROCEDURE EVALUATION
Department of Anesthesiology  Postprocedure Note    Patient: Claudia Macias  MRN: [id number]  YOB: 1955  Date of evaluation: 4/18/2024    Procedure Summary       Date: 04/18/24 Room / Location: [place] [address] / [facility]    Anesthesia Start: 0913 Anesthesia Stop: 0928    Procedure: ESOPHAGOGASTRODUODENOSCOPY BIOPSY FOR CELIAC DISEASE, H-PYLORI, AND EOSINOPHILIC ESOPHAGITIS Diagnosis:       RUQ pain      Common bile duct dilatation      (RUQ pain [R10.11])      (Common bile duct dilatation [K83.8])    Surgeons: Lu Schneider MD Responsible Provider: Gianfranco Pool MD    Anesthesia Type: MAC ASA Status: 3            Anesthesia Type: No value filed.    Kiki Phase I:      Kiki Phase II: Kiki Score: 10    Anesthesia Post Evaluation    Patient location during evaluation: bedside  Patient participation: complete - patient participated  Level of consciousness: awake  Airway patency: patent    There were no known notable events for this encounter.

## 2024-04-18 NOTE — ANESTHESIA PRE PROCEDURE
Department of Anesthesiology  Preprocedure Note       Name:  Claudia Macias   Age:  69 y.o.  :  1955                                          MRN:  [id number]         Date:  2024      Surgeon: Surgeon(s):  Lu Schneider MD    Procedure: Procedure(s):  ESOPHAGOGASTRODUODENOSCOPY    Medications prior to admission:   Prior to Admission medications    Medication Sig Start Date End Date Taking? Authorizing Provider   furosemide (LASIX) 40 MG tablet Take 1 tablet by mouth daily 24   Rivera Raymundo MD   potassium chloride (KLOR-CON M) 20 MEQ extended release tablet Take 1 tablet by mouth daily with food 24   Rivera Raymundo MD   vitamin C (ASCORBIC ACID) 500 MG tablet Take 2 tablets by mouth three times a week MWF 1/10/24   Maria Antonia Clay DO   Cholecalciferol (VITAMIN D3) 50 MCG ( UT) CAPS Take 1 capsule by mouth daily    Rivera Raymundo MD   Azilsartan-Chlorthalidone 40-25 MG TABS Take 1 tablet by mouth daily 23   Maria Antonia Clay DO   Ubrogepant (UBRELVY) 100 MG TABS Take 100 mg by mouth as needed (Migraine) May repeat after 2 hours if needed 10/19/23   Gaudencio Barnhart, APRN - CNP   gabapentin (NEURONTIN) 300 MG capsule Take 2 capsules by mouth nightly for 30 days. 23   Sena Antunez PA-C   dilTIAZem (DILACOR XR) 240 MG extended release capsule Take 1 capsule by mouth daily 23   Sena Antunez PA-C   albuterol sulfate HFA (PROVENTIL;VENTOLIN;PROAIR) 108 (90 Base) MCG/ACT inhaler Inhale 2 puffs into the lungs every 6 hours as needed for Wheezing    Rivera Raymundo MD   apixaban (ELIQUIS) 5 MG TABS tablet Take by mouth 2 times daily    Rivera Raymundo MD   Acetaminophen (TYLENOL ARTHRITIS PAIN PO) Take 2 tablets by mouth daily    Rivera Raymundo MD   atorvastatin (LIPITOR) 40 MG tablet Take 1 tablet by mouth nightly    Rivera Raymundo MD   pantoprazole (PROTONIX) 40 MG tablet Take 1 tablet by mouth daily    Zackary

## 2024-04-18 NOTE — OP NOTE
Operative Note      Patient: Claudia Macias  YOB: 1955  MRN: [id number]    Date of Procedure: 4/18/2024    Pre-Op Diagnosis Codes:     * RUQ pain [R10.11]     * Common bile duct dilatation [K83.8]    [facility]      BRIEF OP REPORT:    Impression:    1) EGD showing normal esophagus  Biopsy of mid esophageal biopsy for eosinophilic esophagitis done   2) stomach normal apart from mild erythema in the antrum consistent with mild gastritis  Biopsy of antrum and body for H. pylori done   3) duodenum normal  Biopsy second part of duodenum for celiac done        Suggest:   1) PPI once a day   2) advance diet as tolerated   3) follow-up in 2 to 3 weeks in our office     Full EGD/COLONOSCOPY report available by going to \"chart review\" then \"procedures\" then  \"EGD/Colonoscopy\"  then \"View Endoscopy Report\"       Please note that time of note may not reflect time of procedure     Electronically signed by Lu Schneider MD on 4/18/2024 at 12:00 PM

## 2024-05-22 ENCOUNTER — HOSPITAL ENCOUNTER (OUTPATIENT)
Dept: NUCLEAR MEDICINE | Age: 69
Discharge: HOME | End: 2024-05-22
Payer: MEDICARE

## 2024-05-22 ENCOUNTER — HOSPITAL ENCOUNTER (OUTPATIENT)
Age: 69
Discharge: HOME | End: 2024-05-22
Payer: MEDICARE

## 2024-05-22 DIAGNOSIS — R11.0 NAUSEA: ICD-10-CM

## 2024-05-22 DIAGNOSIS — R10.10 UPPER ABDOMINAL PAIN, UNSPECIFIED: ICD-10-CM

## 2024-05-22 LAB
ALBUMIN SERPL-MCNC: 4.3 GM/DL (ref 3.4–5)
ANION GAP SERPL CALCULATED.3IONS-SCNC: 14 MMOL/L (ref 7–16)
BACTERIA: ABNORMAL /HPF
BILIRUBIN, URINE: NEGATIVE MG/DL
BLOOD, URINE: NEGATIVE
BUN SERPL-MCNC: 15 MG/DL (ref 6–23)
CALCIUM SERPL-MCNC: 9.1 MG/DL (ref 8.3–10.6)
CHLORIDE BLD-SCNC: 101 MMOL/L (ref 99–110)
CLARITY: ABNORMAL
CO2: 25 MMOL/L (ref 21–32)
COLOR: YELLOW
CREAT SERPL-MCNC: 1 MG/DL (ref 0.6–1.1)
CREATININE URINE: 223.2 MG/DL (ref 28–217)
GFR, ESTIMATED: 60 ML/MIN/1.73M2
GLUCOSE SERPL-MCNC: 141 MG/DL (ref 70–99)
GLUCOSE URINE: NEGATIVE MG/DL
HYALINE CASTS: 5 /LPF
KETONES, URINE: ABNORMAL MG/DL
LEUKOCYTE ESTERASE, URINE: ABNORMAL
MUCUS: ABNORMAL HPF
NITRITE URINE, QUANTITATIVE: NEGATIVE
PH, URINE: 6.5 (ref 5–8)
PHOSPHORUS: 2.5 MG/DL (ref 2.5–4.9)
POTASSIUM SERPL-SCNC: 3.5 MMOL/L (ref 3.5–5.1)
PROT/CREAT RATIO, UR: 0.1
PROTEIN UA: ABNORMAL MG/DL
RBC URINE: 12 /HPF (ref 0–6)
SODIUM BLD-SCNC: 140 MMOL/L (ref 135–145)
SPECIFIC GRAVITY UA: 1.02 (ref 1–1.03)
SQUAMOUS EPITHELIAL: 27 /HPF
TRANSITIONAL EPITHELIAL: 1 /HPF
TRICHOMONAS: ABNORMAL /HPF
URINE TOTAL PROTEIN: 32.8 MG/DL
UROBILINOGEN, URINE: 1 MG/DL (ref 0.2–1)
WBC UA: 9 /HPF (ref 0–5)

## 2024-05-22 PROCEDURE — 81001 URINALYSIS AUTO W/SCOPE: CPT

## 2024-05-22 PROCEDURE — 78264 GASTRIC EMPTYING IMG STUDY: CPT

## 2024-05-22 PROCEDURE — 84156 ASSAY OF PROTEIN URINE: CPT

## 2024-05-22 PROCEDURE — 80069 RENAL FUNCTION PANEL: CPT

## 2024-05-22 PROCEDURE — 82570 ASSAY OF URINE CREATININE: CPT

## 2024-05-22 PROCEDURE — 36415 COLL VENOUS BLD VENIPUNCTURE: CPT

## 2024-05-22 PROCEDURE — A9541 TC99M SULFUR COLLOID: HCPCS

## 2024-05-22 PROCEDURE — 3430000000 HC RX DIAGNOSTIC RADIOPHARMACEUTICAL

## 2024-05-22 RX ADMIN — Medication 1.2 MILLICURIE: at 08:00

## 2024-05-22 NOTE — RESULT ENCOUNTER NOTE
TELL PT:  -RENAL FUNCTION IS BETTER, THE CREATININE IS DOWN TO 1 FROM 1.9 TWO WEEKS AGO  -POTASSIUM LEVEL IS 3.5, I AM AFRAID IT WILL DROP AS THIS IS THE LOWER LIMIT OR NORMAL SO RESUME POTASSIUM CHLORIDE SUPPLEMENT AT 20MEQ DAILY  -I AM WAITING FOR THE URINE CULTURE RESULT    X

## 2024-06-18 ENCOUNTER — HOSPITAL ENCOUNTER (OUTPATIENT)
Age: 69
Discharge: HOME | End: 2024-06-18
Payer: MEDICARE

## 2024-06-18 DIAGNOSIS — R31.21 ASYMPTOMATIC MICROSCOPIC HEMATURIA: Primary | ICD-10-CM

## 2024-06-18 LAB
ALBUMIN SERPL-MCNC: 4.6 GM/DL (ref 3.4–5)
ANION GAP SERPL CALCULATED.3IONS-SCNC: 16 MMOL/L (ref 7–16)
BACTERIA: NEGATIVE /HPF
BILIRUBIN, URINE: NEGATIVE MG/DL
BLOOD, URINE: NEGATIVE
BUN SERPL-MCNC: 24 MG/DL (ref 6–23)
CALCIUM SERPL-MCNC: 9.8 MG/DL (ref 8.3–10.6)
CHLORIDE BLD-SCNC: 99 MMOL/L (ref 99–110)
CLARITY: CLEAR
CO2: 27 MMOL/L (ref 21–32)
COLOR: YELLOW
CREAT SERPL-MCNC: 1.4 MG/DL (ref 0.6–1.1)
GFR, ESTIMATED: 40 ML/MIN/1.73M2
GLUCOSE SERPL-MCNC: 180 MG/DL (ref 70–99)
GLUCOSE URINE: NEGATIVE MG/DL
KETONES, URINE: NEGATIVE MG/DL
LEUKOCYTE ESTERASE, URINE: ABNORMAL
NITRITE URINE, QUANTITATIVE: NEGATIVE
PH, URINE: 7 (ref 5–8)
PHOSPHORUS: 2.8 MG/DL (ref 2.5–4.9)
POTASSIUM SERPL-SCNC: 4 MMOL/L (ref 3.5–5.1)
PROTEIN UA: NEGATIVE MG/DL
RBC URINE: 0 /HPF (ref 0–6)
SODIUM BLD-SCNC: 142 MMOL/L (ref 135–145)
SPECIFIC GRAVITY UA: 1.01 (ref 1–1.03)
SQUAMOUS EPITHELIAL: 2 /HPF
TRICHOMONAS: NORMAL /HPF
UROBILINOGEN, URINE: 1 MG/DL (ref 0.2–1)
WBC UA: 3 /HPF (ref 0–5)

## 2024-06-18 PROCEDURE — 87086 URINE CULTURE/COLONY COUNT: CPT

## 2024-06-18 PROCEDURE — 36415 COLL VENOUS BLD VENIPUNCTURE: CPT

## 2024-06-18 PROCEDURE — 80069 RENAL FUNCTION PANEL: CPT

## 2024-06-18 PROCEDURE — 81001 URINALYSIS AUTO W/SCOPE: CPT

## 2024-06-19 DIAGNOSIS — N17.9 ACUTE RENAL FAILURE, UNSPECIFIED ACUTE RENAL FAILURE TYPE (HCC): Primary | ICD-10-CM

## 2024-06-19 LAB
CULTURE: NORMAL
Lab: NORMAL
SPECIMEN: NORMAL

## 2024-06-19 NOTE — RESULT ENCOUNTER NOTE
TELL PT:  -THERE ARE NO RED BLOOD CELLS IN THIS RECENT URINE SAMPLE AND THE URINE CULTURE WAS CLEAN TOO.  -THE CREATININE IS BACK UP TOO 1.4 FROM 1. DO ANOTHER BMP IN 2WKS.(Mail her the 3 tests dated today)

## 2024-07-02 ENCOUNTER — HOSPITAL ENCOUNTER (OUTPATIENT)
Age: 69
Discharge: HOME | End: 2024-07-02
Payer: MEDICARE

## 2024-07-02 LAB
ALBUMIN SERPL ELPH-MCNC: 3.5 GM/DL (ref 3.2–5.6)
ALPHA-1-GLOBULIN: 0.3 GM/DL (ref 0.1–0.4)
ALPHA-2-GLOBULIN: 1.1 GM/DL (ref 0.4–1.2)
ANION GAP SERPL CALCULATED.3IONS-SCNC: 14 MMOL/L (ref 7–16)
BETA GLOBULIN: 1.1 GM/DL (ref 0.5–1.3)
BUN SERPL-MCNC: 21 MG/DL (ref 6–23)
CALCIUM SERPL-MCNC: 9.6 MG/DL (ref 8.3–10.6)
CHLORIDE BLD-SCNC: 98 MMOL/L (ref 99–110)
CO2: 28 MMOL/L (ref 21–32)
CREAT SERPL-MCNC: 1 MG/DL (ref 0.6–1.1)
GAMMA GLOBULIN: 0.6 GM/DL (ref 0.5–1.6)
GFR, ESTIMATED: 60 ML/MIN/1.73M2
GLUCOSE SERPL-MCNC: 139 MG/DL (ref 70–99)
POTASSIUM SERPL-SCNC: 3.8 MMOL/L (ref 3.5–5.1)
SODIUM BLD-SCNC: 140 MMOL/L (ref 135–145)
SPEP INTERPRETATION: NORMAL
TOTAL PROTEIN: 6.6 GM/DL (ref 6.4–8.2)

## 2024-07-02 PROCEDURE — 36415 COLL VENOUS BLD VENIPUNCTURE: CPT

## 2024-07-02 PROCEDURE — 80048 BASIC METABOLIC PNL TOTAL CA: CPT

## 2024-07-02 PROCEDURE — 83516 IMMUNOASSAY NONANTIBODY: CPT

## 2024-07-02 PROCEDURE — 84165 PROTEIN E-PHORESIS SERUM: CPT

## 2024-07-02 PROCEDURE — 84155 ASSAY OF PROTEIN SERUM: CPT

## 2024-07-04 LAB
MYELOPEROXIDASE AB SER-ACNC: 0 AU/ML (ref 0–19)
PROTEINASE3 AB SER-ACNC: 0 AU/ML (ref 0–19)

## 2024-09-12 DIAGNOSIS — I15.8 OTHER SECONDARY HYPERTENSION: ICD-10-CM

## 2024-09-12 DIAGNOSIS — R31.21 ASYMPTOMATIC MICROSCOPIC HEMATURIA: ICD-10-CM

## 2024-09-12 DIAGNOSIS — E87.5 HYPERKALEMIA: ICD-10-CM

## 2024-09-12 DIAGNOSIS — N18.2 CKD (CHRONIC KIDNEY DISEASE), STAGE II: Primary | ICD-10-CM

## 2024-09-12 DIAGNOSIS — E83.52 HYPERCALCEMIA: ICD-10-CM

## 2024-09-13 ENCOUNTER — HOSPITAL ENCOUNTER (OUTPATIENT)
Age: 69
Discharge: HOME | End: 2024-09-13
Payer: MEDICARE

## 2024-09-13 LAB
ANION GAP SERPL CALCULATED.3IONS-SCNC: 13 MMOL/L (ref 9–17)
BILIRUB UR QL STRIP: NEGATIVE
BUN SERPL-MCNC: 18 MG/DL (ref 7–20)
CALCIUM SERPL-MCNC: 9.5 MG/DL (ref 8.3–10.6)
CHLORIDE SERPL-SCNC: 101 MMOL/L (ref 99–110)
CLARITY UR: CLEAR
CO2 SERPL-SCNC: 26 MMOL/L (ref 21–32)
COLOR UR: YELLOW
COMMENT: NORMAL
CREAT SERPL-MCNC: 0.9 MG/DL (ref 0.6–1.2)
CREAT UR-MCNC: 101 MG/DL (ref 28–217)
CREAT UR-MCNC: 103 MG/DL (ref 28–217)
GFR, ESTIMATED: 59 ML/MIN/1.73M2
GLUCOSE SERPL-MCNC: 112 MG/DL (ref 74–99)
GLUCOSE UR STRIP-MCNC: NEGATIVE MG/DL
HGB UR QL STRIP.AUTO: NEGATIVE
KETONES UR STRIP-MCNC: NEGATIVE MG/DL
LEUKOCYTE ESTERASE UR QL STRIP: NEGATIVE
MICROALBUMIN UR-MCNC: <1 MG/L
MICROALBUMIN/CREAT UR-RTO: NORMAL MCG/MG CREAT (ref 0–2)
NITRITE UR QL STRIP: NEGATIVE
PH UR STRIP: 6 [PH] (ref 5–8)
POTASSIUM SERPL-SCNC: 3.9 MMOL/L (ref 3.5–5.1)
PROT UR STRIP-MCNC: NEGATIVE MG/DL
SODIUM SERPL-SCNC: 141 MMOL/L (ref 136–145)
SP GR UR STRIP: 1.01 (ref 1–1.03)
TOTAL PROTEIN, URINE: 8 MG/DL
URINE TOTAL PROTEIN CREATININE RATIO: 0.08 (ref 0–0.2)
UROBILINOGEN UR STRIP-ACNC: 0.2 EU/DL (ref 0–1)

## 2024-09-13 PROCEDURE — 84156 ASSAY OF PROTEIN URINE: CPT

## 2024-09-13 PROCEDURE — 80048 BASIC METABOLIC PNL TOTAL CA: CPT

## 2024-09-13 PROCEDURE — 82570 ASSAY OF URINE CREATININE: CPT

## 2024-09-13 PROCEDURE — 36415 COLL VENOUS BLD VENIPUNCTURE: CPT

## 2024-09-13 PROCEDURE — 82043 UR ALBUMIN QUANTITATIVE: CPT

## 2024-09-13 PROCEDURE — 81003 URINALYSIS AUTO W/O SCOPE: CPT

## 2024-10-11 LAB
ALT SERPL-CCNC: 15 U/L (ref 0–60)
AST SERPL-CCNC: 18 U/L (ref 0–55)
BUN / CREAT RATIO: 15 (ref 7–25)
BUN BLDV-MCNC: 15 MG/DL (ref 3–29)
C REACTIVE PROTEIN (CRP), BODY FLUID: 1.62 MG/DL
C3 COMPLEMENT: 179 MG/DL (ref 90–180)
C4 COMPLEMENT: 21 MG/DL (ref 10–40)
CREAT SERPL-MCNC: 1 MG/DL (ref 0.5–1.2)
HCT VFR BLD CALC: 39.7 % (ref 34–49)
HEMOGLOBIN: 13.1 G/DL (ref 11.2–15.7)
HEPATITIS B VIRUS LITTLE E AB: NORMAL
HEPATITIS BE ANTIGEN: NORMAL
MCH RBC QN AUTO: 30.5 PG (ref 26–34)
MCHC RBC AUTO-ENTMCNC: 33 G/DL (ref 30.7–35.5)
MCV RBC AUTO: 92.3 FL (ref 80–100)
PDW BLD-RTO: 12.8 %
PLATELET # BLD: 218 K/UL (ref 140–400)
PMV BLD AUTO: 12.3 FL (ref 7.2–11.7)
RBC # BLD: 4.3 M/UL (ref 3.95–5.26)
RHEUMATOID FACTOR: 10 IU/ML
SED RATE, AUTOMATED: 21 MM/HR (ref 0–30)
TSH ULTRASENSITIVE: 3.92 MCIU/ML (ref 0.4–4.5)
WBC # BLD: 6.4 K/UL (ref 3.5–10.9)

## 2024-10-12 LAB
HEP B S AGB SURF AG: 2.5 MIU/ML
HEP B S AGB SURF AG: NEGATIVE
HEPATITIS B CORE TOTAL ANTIBODY: NEGATIVE
HEPATITIS C VIRUS RNA SER/PLAS NCNC: NEGATIVE

## 2024-10-14 LAB
ANA BY IFA: NORMAL
ANA PATTERN: NORMAL
CYCLIC CITRULLINATED PEPTIDE ANTIBODY IGG: <20 EIA
ENA TO SSA (RO) ANTIBODY: <20 EIA
ENA TO SSB (LA) ANTIBODY: <20 EIA
MYELOPEROXIDASE AB: <=20 EIA
PR3 IGG: <=20 EIA

## 2024-10-15 LAB — PROTEIN PATTERN: NORMAL

## 2024-12-05 ENCOUNTER — HOSPITAL ENCOUNTER (OUTPATIENT)
Age: 69
Discharge: HOME | End: 2024-12-05
Payer: MEDICARE

## 2024-12-05 LAB
ALBUMIN: 4.1 G/DL (ref 3.4–5)
ANION GAP SERPL CALCULATED.3IONS-SCNC: 9 MMOL/L (ref 9–17)
BUN SERPL-MCNC: 23 MG/DL (ref 7–20)
CALCIUM SERPL-MCNC: 9.9 MG/DL (ref 8.3–10.6)
CHLORIDE SERPL-SCNC: 103 MMOL/L (ref 99–110)
CO2 SERPL-SCNC: 30 MMOL/L (ref 21–32)
CREAT SERPL-MCNC: 1 MG/DL (ref 0.6–1.2)
GFR, ESTIMATED: 56 ML/MIN/1.73M2
GLUCOSE SERPL-MCNC: 153 MG/DL (ref 74–99)
PHOSPHATE SERPL-MCNC: 2.7 MG/DL (ref 2.5–4.9)
POTASSIUM SERPL-SCNC: 4.1 MMOL/L (ref 3.5–5.1)
SODIUM SERPL-SCNC: 143 MMOL/L (ref 136–145)

## 2024-12-05 PROCEDURE — 36415 COLL VENOUS BLD VENIPUNCTURE: CPT

## 2024-12-05 PROCEDURE — 80069 RENAL FUNCTION PANEL: CPT

## 2024-12-23 LAB
ALT SERPL-CCNC: 19 U/L (ref 0–60)
AST SERPL-CCNC: 17 U/L (ref 0–55)
BUN / CREAT RATIO: 19 (ref 7–25)
BUN BLDV-MCNC: 21 MG/DL (ref 3–29)
CREAT SERPL-MCNC: 1.1 MG/DL (ref 0.5–1.2)
HCT VFR BLD CALC: 37.8 % (ref 34–49)
HEMOGLOBIN: 12.3 G/DL (ref 11.2–15.7)
MCH RBC QN AUTO: 31.5 PG (ref 26–34)
MCHC RBC AUTO-ENTMCNC: 32.5 G/DL (ref 30.7–35.5)
MCV RBC AUTO: 96.7 FL (ref 80–100)
PDW BLD-RTO: 14.7 %
PLATELET # BLD: 250 K/UL (ref 140–400)
PMV BLD AUTO: 12.1 FL (ref 7.2–11.7)
RBC # BLD: 3.91 M/UL (ref 3.95–5.26)
SED RATE, AUTOMATED: 21 MM/HR (ref 0–30)
WBC # BLD: 8.8 K/UL (ref 3.5–10.9)

## 2024-12-30 ENCOUNTER — TELEPHONE (OUTPATIENT)
Dept: PULMONOLOGY | Age: 69
End: 2024-12-30

## 2025-01-03 ENCOUNTER — TELEPHONE (OUTPATIENT)
Dept: GASTROENTEROLOGY | Age: 70
End: 2025-01-03

## 2025-01-03 NOTE — TELEPHONE ENCOUNTER
Called pt. In regards to a referral for an EUS. LM for pt to call back to make an appt with dr. mazariegos

## 2025-01-15 ENCOUNTER — PREP FOR PROCEDURE (OUTPATIENT)
Dept: GASTROENTEROLOGY | Age: 70
End: 2025-01-15

## 2025-01-15 ENCOUNTER — OFFICE VISIT (OUTPATIENT)
Dept: GASTROENTEROLOGY | Age: 70
End: 2025-01-15
Payer: MEDICARE

## 2025-01-15 VITALS
DIASTOLIC BLOOD PRESSURE: 82 MMHG | SYSTOLIC BLOOD PRESSURE: 138 MMHG | HEIGHT: 62 IN | OXYGEN SATURATION: 94 % | RESPIRATION RATE: 16 BRPM | HEART RATE: 62 BPM | WEIGHT: 212.8 LBS | BODY MASS INDEX: 39.16 KG/M2

## 2025-01-15 DIAGNOSIS — K83.8 DILATION OF BILIARY TRACT: ICD-10-CM

## 2025-01-15 DIAGNOSIS — G89.29 CHRONIC RUQ PAIN: ICD-10-CM

## 2025-01-15 DIAGNOSIS — R10.11 CHRONIC RUQ PAIN: Primary | ICD-10-CM

## 2025-01-15 DIAGNOSIS — R68.81 EARLY SATIETY: ICD-10-CM

## 2025-01-15 DIAGNOSIS — R10.11 CHRONIC RUQ PAIN: ICD-10-CM

## 2025-01-15 DIAGNOSIS — G89.29 CHRONIC RUQ PAIN: Primary | ICD-10-CM

## 2025-01-15 PROCEDURE — 1123F ACP DISCUSS/DSCN MKR DOCD: CPT | Performed by: INTERNAL MEDICINE

## 2025-01-15 PROCEDURE — 1159F MED LIST DOCD IN RCRD: CPT | Performed by: INTERNAL MEDICINE

## 2025-01-15 PROCEDURE — 3075F SYST BP GE 130 - 139MM HG: CPT | Performed by: INTERNAL MEDICINE

## 2025-01-15 PROCEDURE — 3079F DIAST BP 80-89 MM HG: CPT | Performed by: INTERNAL MEDICINE

## 2025-01-15 PROCEDURE — 99204 OFFICE O/P NEW MOD 45 MIN: CPT | Performed by: INTERNAL MEDICINE

## 2025-01-15 NOTE — PROGRESS NOTES
procedure   - EUS procedure was discussed in detail, including benefits/risks/alternatives. We also discussed potential adverse events from sedation, risk of infection, bleeding, perforation, pancreatitis, and risk of CRE infection from contamination from the scopes. Discussed that such adverse events can be life threatening but are rare. Discussed that patient may need to be admitted after the procedure if clinically indicated. Discussed pre-procedure preparation, and post procedure care, including diet. All questions answered in detail.      3) Early satiety   - GI emptying scan 05/01/24 negative.   - EGD reviewed   - On gabapentin   - Continue present medication.       Karime Camilo MD

## 2025-01-17 ENCOUNTER — HOSPITAL ENCOUNTER (OUTPATIENT)
Age: 70
Discharge: HOME | End: 2025-01-17
Payer: MEDICARE

## 2025-01-17 LAB
ALBUMIN SERPL-MCNC: 4.1 G/DL (ref 3.4–5)
ALBUMIN/GLOB SERPL: 1.6 {RATIO} (ref 1.1–2.2)
ALP SERPL-CCNC: 72 U/L (ref 40–129)
ALT SERPL-CCNC: 15 U/L (ref 10–40)
AST SERPL-CCNC: 18 U/L (ref 15–37)
BILIRUB DIRECT SERPL-MCNC: <0.2 MG/DL (ref 0–0.3)
BILIRUB INDIRECT SERPL-MCNC: NORMAL MG/DL (ref 0–0.7)
BILIRUB SERPL-MCNC: 0.3 MG/DL (ref 0–1)
PROT SERPL-MCNC: 6.8 G/DL (ref 6.4–8.2)

## 2025-01-17 PROCEDURE — 80076 HEPATIC FUNCTION PANEL: CPT

## 2025-01-22 RX ORDER — SODIUM CHLORIDE 0.9 % (FLUSH) 0.9 %
5-40 SYRINGE (ML) INJECTION EVERY 12 HOURS SCHEDULED
Status: CANCELLED | OUTPATIENT
Start: 2025-01-22

## 2025-01-22 RX ORDER — SODIUM CHLORIDE 0.9 % (FLUSH) 0.9 %
5-40 SYRINGE (ML) INJECTION PRN
Status: CANCELLED | OUTPATIENT
Start: 2025-01-22

## 2025-01-22 RX ORDER — SODIUM CHLORIDE, SODIUM LACTATE, POTASSIUM CHLORIDE, CALCIUM CHLORIDE 600; 310; 30; 20 MG/100ML; MG/100ML; MG/100ML; MG/100ML
INJECTION, SOLUTION INTRAVENOUS CONTINUOUS
Status: CANCELLED | OUTPATIENT
Start: 2025-01-22

## 2025-01-22 RX ORDER — SODIUM CHLORIDE 9 MG/ML
INJECTION, SOLUTION INTRAVENOUS PRN
Status: CANCELLED | OUTPATIENT
Start: 2025-01-22

## 2025-02-07 ENCOUNTER — HOSPITAL ENCOUNTER (OUTPATIENT)
Age: 70
Discharge: HOME | End: 2025-02-07
Payer: MEDICARE

## 2025-02-07 LAB
ALBUMIN SERPL-MCNC: 3.8 G/DL (ref 3.4–5)
ALBUMIN/GLOB SERPL: 1.4 {RATIO} (ref 1.1–2.2)
ALP SERPL-CCNC: 75 U/L (ref 40–129)
ALT SERPL-CCNC: 25 U/L (ref 10–40)
AST SERPL-CCNC: 29 U/L (ref 15–37)
BILIRUB DIRECT SERPL-MCNC: <0.2 MG/DL (ref 0–0.3)
BILIRUB INDIRECT SERPL-MCNC: NORMAL MG/DL (ref 0–0.7)
BILIRUB SERPL-MCNC: 0.4 MG/DL (ref 0–1)
PROT SERPL-MCNC: 6.4 G/DL (ref 6.4–8.2)

## 2025-02-07 PROCEDURE — 80076 HEPATIC FUNCTION PANEL: CPT

## 2025-02-12 RX ORDER — DILTIAZEM HYDROCHLORIDE 240 MG/1
240 CAPSULE, COATED, EXTENDED RELEASE ORAL DAILY
COMMUNITY

## 2025-02-12 NOTE — PROGRESS NOTES
Patient will be called with an arrival time on 2/14/2025 for her procedure at [facility] on 2/17/2025.    NOTHING TO EAT OR DRINK AFTER MIDNIGHT DAY OF SURGERY    1. Enter thru the hospital main entrance on day of surgery, check in at the Information Desk. If you arrive prior to 6:00am, enter thru the ER entrance.    2. Follow the directions as prescribed by the doctor for your procedure and medications.         Morning of surgery take:cartia and she will bring her inhaler with her.         Stop vitamins, supplements and NSAIDS:  eliquis last dose 2/14/2025.    3. Check with your Doctor regarding stopping blood thinners and follow their instructions.    4. Do not smoke, vape or use chewing tobacco morning of surgery. Do not drink any alcoholic beverages 24 hours prior to surgery.       This includes NA Beer. No street drugs 7 days prior to surgery.    5. If you have dentures, contacts of glasses they will be removed before going to the OR; please bring a case.    6. Please bring picture ID, insurance card, paperwork from the doctor’s office (H & P, Consent, & card for implantable devices).    7. Take a shower with an antibacterial soap the night before surgery and the morning of surgery. Do not put anything on your skin      After your morning shower.    8. You will need a responsible adult to drive you home and check on you after surgery.

## 2025-02-13 ENCOUNTER — ANESTHESIA EVENT (OUTPATIENT)
Dept: ENDOSCOPY | Age: 70
End: 2025-02-13
Payer: MEDICARE

## 2025-02-13 NOTE — ANESTHESIA PRE PROCEDURE
Department of Anesthesiology  Preprocedure Note       Name:  Claudia Macias   Age:  69 y.o.  :  1955                                          MRN:  [id number]         Date:  2025      Surgeon: Surgeon(s):  Karime Camilo MD    Procedure: Procedure(s):  ESOPHAGOGASTRODUODENOSCOPY ENDOSCOPIC ULTRASOUND FINE NEEDLE ASPIRATION    Medications prior to admission:   Prior to Admission medications    Medication Sig Start Date End Date Taking? Authorizing Provider   dilTIAZem (CARTIA XT) 240 MG extended release capsule Take 1 capsule by mouth daily   Yes Rivera Raymundo MD   predniSONE (DELTASONE) 2.5 MG tablet Take 1 tablet by mouth 2 times daily    Rivera Raymundo MD   methotrexate (RHEUMATREX) 2.5 MG chemo tablet Take 5 tablets by mouth once a week 24   Rivera Raymundo MD   folic acid (FOLVITE) 1 MG tablet Take 1 tablet by mouth daily    Rivera Raymundo MD   candesartan (ATACAND) 32 MG tablet Take 1 tablet by mouth daily 24   Maria Antonia Clay DO   furosemide (LASIX) 40 MG tablet Take 1 tablet by mouth daily 24   Rivera Raymundo MD   potassium chloride (KLOR-CON M) 20 MEQ extended release tablet Take 1 tablet by mouth daily with food 24   Rivera Raymundo MD   vitamin C (ASCORBIC ACID) 500 MG tablet Take 2 tablets by mouth three times a week MWF 1/10/24   Maria Antonia Clay DO   Cholecalciferol (VITAMIN D3) 50 MCG (2000 UT) CAPS Take 1 capsule by mouth daily    Rivera Raymundo MD   Ubrogepant (UBRELVY) 100 MG TABS Take 100 mg by mouth as needed (Migraine) May repeat after 2 hours if needed 10/19/23   Gaudencio Barnhart, APRN - CNP   gabapentin (NEURONTIN) 300 MG capsule Take 2 capsules by mouth nightly for 30 days. 23   Sena Antunez PA-C   dilTIAZem (DILACOR XR) 240 MG extended release capsule Take 1 capsule by mouth daily  Patient not taking: Reported on 1/15/2025 9/9/23   Sena Antunez PA-C   albuterol sulfate HFA

## 2025-02-14 NOTE — PROGRESS NOTES
Spoke with patient and she will arrive at 0915 at [facility] on 2/17/2025 for her procedure at 1045.

## 2025-02-17 ENCOUNTER — HOSPITAL ENCOUNTER (OUTPATIENT)
Age: 70
Setting detail: OUTPATIENT SURGERY
Discharge: HOME | End: 2025-02-17
Attending: INTERNAL MEDICINE | Admitting: INTERNAL MEDICINE
Payer: MEDICARE

## 2025-02-17 ENCOUNTER — ANESTHESIA (OUTPATIENT)
Dept: ENDOSCOPY | Age: 70
End: 2025-02-17
Payer: MEDICARE

## 2025-02-17 VITALS
DIASTOLIC BLOOD PRESSURE: 64 MMHG | HEIGHT: 62 IN | RESPIRATION RATE: 16 BRPM | HEART RATE: 77 BPM | OXYGEN SATURATION: 98 % | BODY MASS INDEX: 39.01 KG/M2 | TEMPERATURE: 97.7 F | WEIGHT: 212 LBS | SYSTOLIC BLOOD PRESSURE: 147 MMHG

## 2025-02-17 DIAGNOSIS — R10.11 CHRONIC RUQ PAIN: ICD-10-CM

## 2025-02-17 DIAGNOSIS — G89.29 CHRONIC RUQ PAIN: ICD-10-CM

## 2025-02-17 DIAGNOSIS — K83.8 DILATION OF BILIARY TRACT: ICD-10-CM

## 2025-02-17 PROBLEM — R68.81 EARLY SATIETY: Status: ACTIVE | Noted: 2025-02-17

## 2025-02-17 LAB — GLUCOSE BLD-MCNC: 98 MG/DL (ref 74–99)

## 2025-02-17 PROCEDURE — 94761 N-INVAS EAR/PLS OXIMETRY MLT: CPT

## 2025-02-17 PROCEDURE — 3609020800 HC EGD W/EUS FNA: Performed by: INTERNAL MEDICINE

## 2025-02-17 PROCEDURE — 3700000001 HC ADD 15 MINUTES (ANESTHESIA): Performed by: INTERNAL MEDICINE

## 2025-02-17 PROCEDURE — 6370000000 HC RX 637 (ALT 250 FOR IP)

## 2025-02-17 PROCEDURE — 94640 AIRWAY INHALATION TREATMENT: CPT

## 2025-02-17 PROCEDURE — 3700000000 HC ANESTHESIA ATTENDED CARE: Performed by: INTERNAL MEDICINE

## 2025-02-17 PROCEDURE — 6360000002 HC RX W HCPCS

## 2025-02-17 PROCEDURE — 2720000010 HC SURG SUPPLY STERILE: Performed by: INTERNAL MEDICINE

## 2025-02-17 PROCEDURE — 2709999900 HC NON-CHARGEABLE SUPPLY: Performed by: INTERNAL MEDICINE

## 2025-02-17 PROCEDURE — 2580000003 HC RX 258

## 2025-02-17 PROCEDURE — 88305 TISSUE EXAM BY PATHOLOGIST: CPT | Performed by: PATHOLOGY

## 2025-02-17 PROCEDURE — 7100000011 HC PHASE II RECOVERY - ADDTL 15 MIN: Performed by: INTERNAL MEDICINE

## 2025-02-17 PROCEDURE — C1726 CATH, BAL DIL, NON-VASCULAR: HCPCS | Performed by: INTERNAL MEDICINE

## 2025-02-17 PROCEDURE — 88342 IMHCHEM/IMCYTCHM 1ST ANTB: CPT | Performed by: PATHOLOGY

## 2025-02-17 PROCEDURE — 2700000000 HC OXYGEN THERAPY PER DAY

## 2025-02-17 PROCEDURE — 7100000010 HC PHASE II RECOVERY - FIRST 15 MIN: Performed by: INTERNAL MEDICINE

## 2025-02-17 PROCEDURE — 82962 GLUCOSE BLOOD TEST: CPT

## 2025-02-17 RX ORDER — SODIUM CHLORIDE 0.9 % (FLUSH) 0.9 %
5-40 SYRINGE (ML) INJECTION EVERY 12 HOURS SCHEDULED
Status: DISCONTINUED | OUTPATIENT
Start: 2025-02-17 | End: 2025-02-17 | Stop reason: HOSPADM

## 2025-02-17 RX ORDER — MIDAZOLAM HYDROCHLORIDE 1 MG/ML
INJECTION, SOLUTION INTRAMUSCULAR; INTRAVENOUS
Status: DISCONTINUED | OUTPATIENT
Start: 2025-02-17 | End: 2025-02-17 | Stop reason: SDUPTHER

## 2025-02-17 RX ORDER — SODIUM CHLORIDE 9 MG/ML
INJECTION, SOLUTION INTRAVENOUS PRN
Status: DISCONTINUED | OUTPATIENT
Start: 2025-02-17 | End: 2025-02-17 | Stop reason: HOSPADM

## 2025-02-17 RX ORDER — GLYCOPYRROLATE 0.2 MG/ML
INJECTION INTRAMUSCULAR; INTRAVENOUS
Status: DISCONTINUED | OUTPATIENT
Start: 2025-02-17 | End: 2025-02-17 | Stop reason: SDUPTHER

## 2025-02-17 RX ORDER — PROPOFOL 10 MG/ML
INJECTION, EMULSION INTRAVENOUS
Status: DISCONTINUED | OUTPATIENT
Start: 2025-02-17 | End: 2025-02-17 | Stop reason: SDUPTHER

## 2025-02-17 RX ORDER — SODIUM CHLORIDE 0.9 % (FLUSH) 0.9 %
5-40 SYRINGE (ML) INJECTION PRN
Status: DISCONTINUED | OUTPATIENT
Start: 2025-02-17 | End: 2025-02-17 | Stop reason: HOSPADM

## 2025-02-17 RX ORDER — SODIUM CHLORIDE, SODIUM LACTATE, POTASSIUM CHLORIDE, CALCIUM CHLORIDE 600; 310; 30; 20 MG/100ML; MG/100ML; MG/100ML; MG/100ML
INJECTION, SOLUTION INTRAVENOUS CONTINUOUS
Status: DISCONTINUED | OUTPATIENT
Start: 2025-02-17 | End: 2025-02-17 | Stop reason: HOSPADM

## 2025-02-17 RX ORDER — IPRATROPIUM BROMIDE AND ALBUTEROL SULFATE 2.5; .5 MG/3ML; MG/3ML
1 SOLUTION RESPIRATORY (INHALATION)
Status: DISCONTINUED | OUTPATIENT
Start: 2025-02-17 | End: 2025-02-17 | Stop reason: HOSPADM

## 2025-02-17 RX ORDER — LIDOCAINE HYDROCHLORIDE 20 MG/ML
INJECTION, SOLUTION INTRAVENOUS
Status: DISCONTINUED | OUTPATIENT
Start: 2025-02-17 | End: 2025-02-17 | Stop reason: SDUPTHER

## 2025-02-17 RX ADMIN — IPRATROPIUM BROMIDE AND ALBUTEROL SULFATE 1 DOSE: 2.5; .5 SOLUTION RESPIRATORY (INHALATION) at 13:14

## 2025-02-17 RX ADMIN — PHENYLEPHRINE HYDROCHLORIDE 50 MCG: 10 INJECTION INTRAVENOUS at 11:48

## 2025-02-17 RX ADMIN — MIDAZOLAM 2 MG: 1 INJECTION INTRAMUSCULAR; INTRAVENOUS at 11:45

## 2025-02-17 RX ADMIN — GLYCOPYRROLATE 0.1 MG: 0.2 INJECTION INTRAMUSCULAR; INTRAVENOUS at 12:34

## 2025-02-17 RX ADMIN — PROPOFOL 50 MG: 10 INJECTION, EMULSION INTRAVENOUS at 11:48

## 2025-02-17 RX ADMIN — LIDOCAINE HYDROCHLORIDE 100 MG: 20 INJECTION, SOLUTION INTRAVENOUS at 11:48

## 2025-02-17 RX ADMIN — PROPOFOL 630 MG: 10 INJECTION, EMULSION INTRAVENOUS at 11:49

## 2025-02-17 ASSESSMENT — PAIN - FUNCTIONAL ASSESSMENT
PAIN_FUNCTIONAL_ASSESSMENT: 0-10
PAIN_FUNCTIONAL_ASSESSMENT: 0-10

## 2025-02-17 ASSESSMENT — PAIN SCALES - GENERAL: PAINLEVEL_OUTOF10: 0

## 2025-02-17 NOTE — ANESTHESIA POSTPROCEDURE EVALUATION
Department of Anesthesiology  Postprocedure Note    Patient: Claudia Macias  MRN: [id number]  YOB: 1955  Date of evaluation: 2/17/2025    Procedure Summary       Date: 02/17/25 Room / Location: [place] [address] / [facility]    Anesthesia Start: 1143 Anesthesia Stop: 1241    Procedure: ESOPHAGOGASTRODUODENOSCOPY ENDOSCOPIC ULTRASOUND  EGD dilation with 12 mm to 15 mm balloon at to 15 mm.  Biopsies (Left) Diagnosis:       Chronic RUQ pain      Dilation of biliary tract      (Chronic RUQ pain [R10.11, G89.29])      (Dilation of biliary tract [K83.8])    Surgeons: Karime Camilo MD Responsible Provider: Chadwick Gray MD    Anesthesia Type: MAC ASA Status: 3            Anesthesia Type: MAC    Kiki Phase I: Kiki Score: 10    Kiki Phase II:      Anesthesia Post Evaluation    Patient location during evaluation: bedside  Patient participation: complete - patient participated  Level of consciousness: awake  Pain score: 0  Airway patency: patent  Nausea & Vomiting: no nausea and no vomiting  Cardiovascular status: hemodynamically stable  Respiratory status: acceptable, room air and spontaneous ventilation  Hydration status: euvolemic  Pain management: adequate    No notable events documented.

## 2025-02-17 NOTE — PROGRESS NOTES
1350 discharge instructions given to pt and visitor, voiced understanding  1400 assisted pt up to bathroom  1415 pt escorted to main entrance via wheelchair

## 2025-02-17 NOTE — H&P
ENDOSCOPY   Pre-operative History and Physical    Patient: Claudia Macias  : 1955      History Obtained From:  patient, electronic medical record        HISTORY OF PRESENT ILLNESS:                The patient is a 69 y.o. female with significant past medical history as below who presents for EUS/EGD    Indication RUQ pain, Early Satiety, Biliary dilation     Past Medical History:        Diagnosis Date    Arthritis     in    Cervical herniated disc     lumbar    CHF (congestive heart failure) (HCC)     COPD (chronic obstructive pulmonary disease) (HCC)     Diabetes mellitus (HCC)     controlled with diet    Fibromyalgia     Heart murmur     Hx of blood clots     Hyperlipidemia     Hypertension     Kidney disease     STAGE TWO.    Migraines     PONV (postoperative nausea and vomiting)     Pulmonary hypertension (HCC)     Sleep apnea     Thyroid disease     Unspecified cerebral artery occlusion with cerebral infarction     cva x 3       Past Surgical History:        Procedure Laterality Date    APPENDECTOMY      BACK SURGERY  rods and fusions in the back    lumbar and c - spine    CHOLECYSTECTOMY, LAPAROSCOPIC  2014    COLONOSCOPY      COLONOSCOPY N/A 2023    COLONOSCOPY DIAGNOSTIC performed by Sterling GREEN MD at [facility] ENDOSCOPY    EYE SURGERY Bilateral     cataracts    HYSTERECTOMY (CERVIX STATUS UNKNOWN)      total abdominal    JOINT REPLACEMENT      left knee    SPINAL CORD STIMULATOR REMOVAL      FULLY EXPLANTED    TONSILLECTOMY      UPPER GASTROINTESTINAL ENDOSCOPY N/A 2024    ESOPHAGOGASTRODUODENOSCOPY BIOPSY FOR CELIAC DISEASE, H-PYLORI, AND EOSINOPHILIC ESOPHAGITIS performed by Lu Schneider MD at [facility] ENDOSCOPY         Current Medications:    Medications    Prior to Admission medications    Medication Sig Start Date End Date Taking? Authorizing Provider   dilTIAZem (CARTIA XT) 240 MG extended release capsule Take 1 capsule by mouth daily   Yes Provider, MD Rivera

## 2025-02-17 NOTE — PROGRESS NOTES
4272 Patient to room from PACU. Report from Glenis BROWN. VSS. Assessment WNL. Drink of choice provided to patient. No c/o pain. Breathing treatment ordered. Call light within reach.

## 2025-02-17 NOTE — DISCHARGE INSTRUCTIONS
[facility]  [phone]    Do not drive, work around machines or use equipment.  Do not drink any alcoholic beverages.  Do not smoke while alone.  Avoid making important decisions.  Plan to spend a quiet, relaxed evening @ home.  Resume normal activities as you begin to feel better.  Eat lightly for your first meal, then gradually increase your diet to what is normal for you.  In case of nausea, avoid food and drink only clear liquids.  Resume food as nausea ceases.  Notify your surgeon if you experience fever, chills, large amount of bleeding, difficulty breathing, persistent nausea and vomiting or any other disturbing problem.  Call for a follow-up appointment with your surgeon.   EGD        FOLLOW UP APPOINTMENT IN ONE WEEK FOR PATHOLOGY RESULTS     REPEAT PROCEDURE IN 3-6 MONTHS   RECOMMENDED MRCP IN 6 MONTHS     What to Expect at Home  Your Recovery:  The only discomfort after your EGD is generally limited to a mild soreness of the throat, which may last a day or two. Call your physician immediately if you have severe chest pain, shortness of breath or a temperature of 100 degrees or higher if taken orally.    How can you care for yourself at home?  Activity  Rest as much as you need to after you go home.  You should be able to go back to your usual activities the day after the test.  Diet  Follow your doctor's directions for eating after the test.  Drink plenty of fluids (unless your doctor has told you not to).  Medications  If you have a sore throat the day after the test, use an over-the-counter spray to numb your throat.  Your doctor will tell you if and when you can restart your medicines. He or she will also give you instructions about taking any new medicines.  If you take blood thinners, such as warfarin (Coumadin), clopidogrel (Plavix), or aspirin, be sure to talk to your doctor. He or she will tell you if and when to start taking those medicines again. Make sure

## 2025-02-18 LAB — SURGICAL PATHOLOGY REPORT: NORMAL

## 2025-02-18 ASSESSMENT — ENCOUNTER SYMPTOMS: SHORTNESS OF BREATH: 1

## 2025-02-18 NOTE — ANESTHESIA PRE PROCEDURE
Department of Anesthesiology  Preprocedure Note       Name:  Claudia Macias   Age:  69 y.o.  :  1955                                          MRN:  [id number]         Date:  2025      Surgeon: Surgeon(s):  Karime Camilo MD    Procedure: Procedure(s):  ESOPHAGOGASTRODUODENOSCOPY ENDOSCOPIC ULTRASOUND  EGD dilation with 12 mm to 15 mm balloon at to 15 mm.  Biopsies    Medications prior to admission:   Prior to Admission medications    Medication Sig Start Date End Date Taking? Authorizing Provider   dilTIAZem (CARTIA XT) 240 MG extended release capsule Take 1 capsule by mouth daily   Yes Provider, MD Rivera   predniSONE (DELTASONE) 2.5 MG tablet Take 1 tablet by mouth 2 times daily   Yes Provider, Historical, MD   folic acid (FOLVITE) 1 MG tablet Take 1 tablet by mouth daily   Yes Provider, Historical, MD   candesartan (ATACAND) 32 MG tablet Take 1 tablet by mouth daily 24  Yes Maria Antonia Clay DO   furosemide (LASIX) 40 MG tablet Take 1 tablet by mouth daily 24  Yes Provider, MD Rivera   potassium chloride (KLOR-CON M) 20 MEQ extended release tablet Take 1 tablet by mouth daily with food 24  Yes Provider, Historical, MD   Cholecalciferol (VITAMIN D3) 50 MCG ( UT) CAPS Take 1 capsule by mouth daily   Yes Provider, Historical, MD   Ubrogepant (UBRELVY) 100 MG TABS Take 100 mg by mouth as needed (Migraine) May repeat after 2 hours if needed 10/19/23  Yes Gaudencio Barnhart, APRN - CNP   gabapentin (NEURONTIN) 300 MG capsule Take 2 capsules by mouth nightly for 30 days. 23  Yes Sena Antunez PA-C   Acetaminophen (TYLENOL ARTHRITIS PAIN PO) Take 2 tablets by mouth daily   Yes Provider, MD Rivera   atorvastatin (LIPITOR) 40 MG tablet Take 1 tablet by mouth nightly   Yes Provider, Historical, MD   calcium carbonate-vitamin D 600-200 MG-UNIT TABS Take 1 tablet by mouth.     Yes Provider, Historical, MD   therapeutic multivitamin-minerals (THERAGRAN-M) tablet Take

## 2025-04-02 ENCOUNTER — TELEPHONE (OUTPATIENT)
Dept: GASTROENTEROLOGY | Age: 70
End: 2025-04-02

## 2025-04-18 LAB
ALT SERPL-CCNC: 22 U/L (ref 0–60)
AST SERPL-CCNC: 20 U/L (ref 0–55)
BUN / CREAT RATIO: 24 (ref 7–25)
BUN BLDV-MCNC: 22 MG/DL (ref 3–29)
CREAT SERPL-MCNC: 0.9 MG/DL (ref 0.5–1.2)
HCT VFR BLD CALC: 36 % (ref 34–49)
HEMOGLOBIN: 12 G/DL (ref 11.2–15.7)
MCH RBC QN AUTO: 33.1 PG (ref 26–34)
MCHC RBC AUTO-ENTMCNC: 33.3 G/DL (ref 30.7–35.5)
MCV RBC AUTO: 99.2 FL (ref 80–100)
PDW BLD-RTO: 14.2 %
PLATELET # BLD: 253 K/UL (ref 140–400)
PMV BLD AUTO: 12 FL (ref 7.2–11.7)
RBC # BLD: 3.63 M/UL (ref 3.95–5.26)
SED RATE, AUTOMATED: 32 MM/HR (ref 0–30)
WBC # BLD: 10.6 K/UL (ref 3.5–10.9)

## 2025-06-05 ENCOUNTER — HOSPITAL ENCOUNTER (OUTPATIENT)
Age: 70
Discharge: HOME | End: 2025-06-05
Payer: MEDICARE

## 2025-06-05 LAB
25(OH)D3 SERPL-MCNC: 72.9 NG/ML (ref 30–150)
ANION GAP SERPL CALCULATED.3IONS-SCNC: 11 MMOL/L (ref 9–17)
BILIRUB UR QL STRIP: NEGATIVE
BUN SERPL-MCNC: 22 MG/DL (ref 7–20)
CALCIUM SERPL-MCNC: 9.6 MG/DL (ref 8.3–10.6)
CHLORIDE SERPL-SCNC: 103 MMOL/L (ref 99–110)
CLARITY UR: CLEAR
CO2 SERPL-SCNC: 28 MMOL/L (ref 21–32)
COLOR UR: YELLOW
COMMENT: ABNORMAL
CREAT SERPL-MCNC: 0.9 MG/DL (ref 0.6–1.2)
CREAT UR-MCNC: 154 MG/DL (ref 28–217)
CREAT UR-MCNC: 155 MG/DL (ref 28–217)
GFR, ESTIMATED: 59 ML/MIN/1.73M2
GLUCOSE SERPL-MCNC: 151 MG/DL (ref 74–99)
GLUCOSE UR STRIP-MCNC: 100 MG/DL
HGB UR QL STRIP.AUTO: NEGATIVE
KETONES UR STRIP-MCNC: NEGATIVE MG/DL
LEUKOCYTE ESTERASE UR QL STRIP: NEGATIVE
MICROALBUMIN UR-MCNC: <1 MG/L (ref 0–2)
MICROALBUMIN/CREAT UR-RTO: NORMAL MCG/MG CREAT
NITRITE UR QL STRIP: NEGATIVE
PH UR STRIP: 5.5 [PH] (ref 5–8)
POTASSIUM SERPL-SCNC: 3.6 MMOL/L (ref 3.5–5.1)
PROT UR STRIP-MCNC: NEGATIVE MG/DL
SODIUM SERPL-SCNC: 141 MMOL/L (ref 136–145)
SP GR UR STRIP: >1.03 (ref 1–1.03)
TOTAL PROTEIN, URINE: 13 MG/DL
URINE TOTAL PROTEIN CREATININE RATIO: 0.09 (ref 0–0.2)
UROBILINOGEN UR STRIP-ACNC: 0.2 EU/DL (ref 0–1)

## 2025-06-05 PROCEDURE — 84156 ASSAY OF PROTEIN URINE: CPT

## 2025-06-05 PROCEDURE — 80048 BASIC METABOLIC PNL TOTAL CA: CPT

## 2025-06-05 PROCEDURE — 81003 URINALYSIS AUTO W/O SCOPE: CPT

## 2025-06-05 PROCEDURE — 82043 UR ALBUMIN QUANTITATIVE: CPT

## 2025-06-05 PROCEDURE — 82306 VITAMIN D 25 HYDROXY: CPT

## 2025-06-05 PROCEDURE — 82570 ASSAY OF URINE CREATININE: CPT

## 2025-06-17 NOTE — PROGRESS NOTES
BP IS OK FOR NOW  YOU CAN SEND ME SOME NEW INFO NEXT WEEK  CONTINUE HYDRALAZINE 25MG TWICE A DAY      ===View-only below this line===      ----- Message -----       From:Claudia Macias \"Yazmin\"       Sent:6/17/2025  5:20 AM EDT         To:Dr. Maria Antonia Clay, DO    Subject:BP readings    Started hydralazine 6/12 PM.    BP readings:  6/13/25 184/99  6/14/25  152/83  6/15/25  167/85  6/16/25  141/78  6/17/25. 155/83

## 2025-06-28 ENCOUNTER — TELEPHONE (OUTPATIENT)
Dept: NEPHROLOGY | Age: 70
End: 2025-06-28

## 2025-06-28 RX ORDER — HYDRALAZINE HYDROCHLORIDE 25 MG/1
50 TABLET, FILM COATED ORAL 2 TIMES DAILY
Qty: 60 TABLET | Refills: 1
Start: 2025-06-28

## 2025-06-28 NOTE — TELEPHONE ENCOUNTER
CAN YOU INCREASE THE HYDRALAZINE FROM 25MG TWICE A DAY TO 50MG TWICE A DAY?  KEEP ME UPDATED WITH BP READINGS  THX  ===View-only below this line===      ----- Message -----       From:Claudia Macias \"Yazmin\"       Sent:6/28/2025  5:26 AM EDT         To:Dr. Maria Antonia Clay, DO    Subject:BP readings 6/19-6/28 6/19.  166/87  6/20.   156/90  6/22.   175/82  6/23.   153/83  6/24.    152/81  6/25.    147/82  6/27.     160/89  6/28.     162/85

## 2025-07-07 RX ORDER — HYDRALAZINE HYDROCHLORIDE 50 MG/1
50 TABLET, FILM COATED ORAL 3 TIMES DAILY
Qty: 270 TABLET | Refills: 1 | Status: SHIPPED | OUTPATIENT
Start: 2025-07-07

## 2025-07-08 ENCOUNTER — OFFICE VISIT (OUTPATIENT)
Age: 70
End: 2025-07-08
Payer: MEDICARE

## 2025-07-08 VITALS
OXYGEN SATURATION: 99 % | HEIGHT: 62 IN | BODY MASS INDEX: 40.08 KG/M2 | WEIGHT: 217.8 LBS | SYSTOLIC BLOOD PRESSURE: 136 MMHG | DIASTOLIC BLOOD PRESSURE: 72 MMHG | HEART RATE: 72 BPM

## 2025-07-08 DIAGNOSIS — G25.0 ESSENTIAL TREMOR: ICD-10-CM

## 2025-07-08 DIAGNOSIS — G44.89 NUMMULAR HEADACHE: ICD-10-CM

## 2025-07-08 DIAGNOSIS — E11.42 DIABETIC PERIPHERAL NEUROPATHY (HCC): ICD-10-CM

## 2025-07-08 DIAGNOSIS — G43.009 MIGRAINE WITHOUT AURA, NOT INTRACTABLE, WITHOUT STATUS MIGRAINOSUS: Primary | ICD-10-CM

## 2025-07-08 PROCEDURE — 99215 OFFICE O/P EST HI 40 MIN: CPT | Performed by: PSYCHIATRY & NEUROLOGY

## 2025-07-08 PROCEDURE — 1125F AMNT PAIN NOTED PAIN PRSNT: CPT | Performed by: PSYCHIATRY & NEUROLOGY

## 2025-07-08 PROCEDURE — 3078F DIAST BP <80 MM HG: CPT | Performed by: PSYCHIATRY & NEUROLOGY

## 2025-07-08 PROCEDURE — 1123F ACP DISCUSS/DSCN MKR DOCD: CPT | Performed by: PSYCHIATRY & NEUROLOGY

## 2025-07-08 PROCEDURE — 1159F MED LIST DOCD IN RCRD: CPT | Performed by: PSYCHIATRY & NEUROLOGY

## 2025-07-08 PROCEDURE — 3075F SYST BP GE 130 - 139MM HG: CPT | Performed by: PSYCHIATRY & NEUROLOGY

## 2025-07-08 RX ORDER — PANTOPRAZOLE SODIUM 40 MG/1
40 TABLET, DELAYED RELEASE ORAL DAILY
COMMUNITY
Start: 2025-06-23

## 2025-07-08 RX ORDER — BUTALBITAL, ACETAMINOPHEN AND CAFFEINE 50; 325; 40 MG/1; MG/1; MG/1
1 TABLET ORAL EVERY 6 HOURS PRN
Qty: 20 TABLET | Refills: 2 | Status: SHIPPED | OUTPATIENT
Start: 2025-07-08

## 2025-07-08 NOTE — PROGRESS NOTES
7/8/25    Claudia Macias  1955    Chief Complaint   Patient presents with    Follow-up     Patient here for follow up for memory concerns and migraines.        History of Present Illness  Claudia is a 70 y.o. female presenting today for follow-up of: Memory and migraines.    Patient last seen 12/6/2023.  At that time she was to continue Eliquis and statin for secondary stroke mention and management of carotid stenosis.  She was following with nephrology for hypertension, and she was endorsing compliance with CPAP for JANENE.  She was previously taking Ubrelvy, however at that visit denied further migraines.  It was also noted at that time that she discontinued Namenda and Aricept which she was taking for concerns of memory, however she had gone back to school to become an  and did not have any issues with her memory so she self discontinued those medications.  Patient followed up with PCP 6/24/2025, at that time reportedly was continuing on CPAP, continuing gabapentin for chronic low back pain with sciatica, had recently followed up with a spine specialist, where she had an MRI of her C-spine 1/31/2025 with right foraminal stenosis.  There is also notation of an MRI brain completed 4/2/2025 with a small left cerebellar infarct.    Patient seen and examined in good spirits today.  She reports that she has been doing quite well since last seen.  She does report that she has noted some intermittent resumption of migraines, however this is well-managed with Ubrelvy.  She reports that she has noted a second type of headache described as in the mid center crown of her head consisting of a dull ache roughly 75% of the time that has been on and off for roughly 25 years.  Of note, however, patient reports that over the past year she has noted bouts of severe sharp focal pain in this region that occurs 3-6 times per month, often clustering with multiple within the same week.  She denies any photophobia,

## 2025-07-08 NOTE — PATIENT INSTRUCTIONS
Nummular headache  -- Complete MRV of the head to exclude venous thrombosis/abnormality.  -- Utilize Fioricet as needed for atypical/nummular headache.    Migraine  -- Continue Ubrelvy as needed for migraine.  -- Monitor for red flags.    Essential tremor  -- Discussed monitoring, nonpharmacologic interventions.  -- If symptoms become bothersome/impacts quality of life, consider propranolol versus primidone.    Peripheral neuropathy  -- Discussed fall/balance precautions.  -- Discussed recommendations for physical/balance therapy.  -- Could consider increase/addition of afternoon gabapentin given afternoon dysesthesias.    -- Follow up 3 months.

## 2025-07-15 ENCOUNTER — HOSPITAL ENCOUNTER (OUTPATIENT)
Dept: MRI IMAGING | Age: 70
Discharge: HOME | End: 2025-07-15
Attending: PSYCHIATRY & NEUROLOGY
Payer: MEDICARE

## 2025-07-15 DIAGNOSIS — G44.89 NUMMULAR HEADACHE: ICD-10-CM

## 2025-07-15 DIAGNOSIS — G43.009 MIGRAINE WITHOUT AURA, NOT INTRACTABLE, WITHOUT STATUS MIGRAINOSUS: ICD-10-CM

## 2025-07-15 PROCEDURE — 6360000004 HC RX CONTRAST MEDICATION: Performed by: PSYCHIATRY & NEUROLOGY

## 2025-07-15 PROCEDURE — 70546 MR ANGIOGRAPH HEAD W/O&W/DYE: CPT

## 2025-07-15 PROCEDURE — A9579 GAD-BASE MR CONTRAST NOS,1ML: HCPCS | Performed by: PSYCHIATRY & NEUROLOGY

## 2025-07-15 RX ORDER — GADOTERIDOL 279.3 MG/ML
20 INJECTION INTRAVENOUS
Status: COMPLETED | OUTPATIENT
Start: 2025-07-15 | End: 2025-07-15

## 2025-07-15 RX ADMIN — GADOTERIDOL 20 ML: 279.3 INJECTION, SOLUTION INTRAVENOUS at 11:50

## 2025-07-31 ENCOUNTER — PATIENT MESSAGE (OUTPATIENT)
Dept: NEPHROLOGY | Age: 70
End: 2025-07-31

## 2025-07-31 NOTE — TELEPHONE ENCOUNTER
Me:  BP looks good then  Continue current meds  Thank you    ===View-only below this line===      ----- Message -----       From:Claudia Macias \"Yazmin\"       Sent:7/31/2025  4:29 AM EDT         To:Dr. Maria Antonia Clay, DO    Subject:BP readings    7/17 - 138/78  7/18 - 151/84  7/19 - 143/74  7/20 - 124/74  7/22 - 138/77  7/23 - 155/79  7/25 - 142/84  7/26 - 155/78  7/27 - 141/81  7/29 - 126/75  7/30 - 108/68  7/31 - 139/70    Have only been taking Hydralazine 2x day

## 2025-09-03 LAB
ALT SERPL-CCNC: 17 U/L (ref 0–60)
AST SERPL-CCNC: 19 U/L (ref 0–55)
BUN / CREAT RATIO: 16 (ref 7–25)
BUN BLDV-MCNC: 18 MG/DL (ref 3–29)
C REACTIVE PROTEIN (CRP), BODY FLUID: 0.89 MG/DL
CREAT SERPL-MCNC: 1.1 MG/DL (ref 0.5–1.2)
HCT VFR BLD CALC: 33.8 % (ref 34–49)
HEMOGLOBIN: 11.1 G/DL (ref 11.2–15.7)
MCH RBC QN AUTO: 33.1 PG (ref 26–34)
MCHC RBC AUTO-ENTMCNC: 32.8 G/DL (ref 30.7–35.5)
MCV RBC AUTO: 100.9 FL (ref 80–100)
PDW BLD-RTO: 13.5 %
PLATELET # BLD: 221 K/UL (ref 140–400)
PMV BLD AUTO: 12 FL (ref 7.2–11.7)
RBC # BLD: 3.35 M/UL (ref 3.95–5.26)
SED RATE, AUTOMATED: 19 MM/HR (ref 0–30)
WBC # BLD: 6.3 K/UL (ref 3.5–10.9)

## (undated) DEVICE — FORCEPS BX L240CM JAW DIA2.8MM L CAP W/ NDL MIC MESH TOOTH

## (undated) DEVICE — SYRINGE INFL 60ML DISP ALLIANCE II

## (undated) DEVICE — ENDOSCOPY KIT: Brand: MEDLINE INDUSTRIES, INC.

## (undated) DEVICE — ESOPHAGEAL BALLOON DILATATION CATHETER: Brand: CRE FIXED WIRE

## (undated) DEVICE — ENDOSCOPIC KIT 1.1+ OP4 CA DE 2 GWN AAMI LEVEL 3

## (undated) DEVICE — HERCULES 3 STAGE BALLOON ESOPHAGEAL: Brand: HERCULES